# Patient Record
Sex: FEMALE | Race: BLACK OR AFRICAN AMERICAN
[De-identification: names, ages, dates, MRNs, and addresses within clinical notes are randomized per-mention and may not be internally consistent; named-entity substitution may affect disease eponyms.]

---

## 2018-06-21 ENCOUNTER — RX ONLY (RX ONLY)
Age: 53
End: 2018-06-21

## 2018-06-21 ENCOUNTER — DOCTOR'S OFFICE (OUTPATIENT)
Dept: URBAN - METROPOLITAN AREA CLINIC 136 | Facility: CLINIC | Age: 53
Setting detail: OPHTHALMOLOGY
End: 2018-06-21
Payer: COMMERCIAL

## 2018-06-21 DIAGNOSIS — H25.12: ICD-10-CM

## 2018-06-21 DIAGNOSIS — Z83.511: ICD-10-CM

## 2018-06-21 DIAGNOSIS — H10.11: ICD-10-CM

## 2018-06-21 DIAGNOSIS — H40.052: ICD-10-CM

## 2018-06-21 DIAGNOSIS — H25.11: ICD-10-CM

## 2018-06-21 DIAGNOSIS — H40.051: ICD-10-CM

## 2018-06-21 DIAGNOSIS — H02.011: ICD-10-CM

## 2018-06-21 DIAGNOSIS — H10.12: ICD-10-CM

## 2018-06-21 PROCEDURE — 92133 CPTRZD OPH DX IMG PST SGM ON: CPT | Performed by: OPHTHALMOLOGY

## 2018-06-21 PROCEDURE — 92004 COMPRE OPH EXAM NEW PT 1/>: CPT | Performed by: OPHTHALMOLOGY

## 2018-06-21 ASSESSMENT — REFRACTION_MANIFEST
OS_VA3: 20/
OU_VA: 20/
OD_VA3: 20/
OD_VA3: 20/
OD_VA1: 20/
OS_VA2: 20/
OD_VA3: 20/
OS_VA3: 20/
OS_VA1: 20/
OU_VA: 20/
OS_VA1: 20/
OS_VA2: 20/
OU_VA: 20/
OD_VA2: 20/
OD_VA2: 20/
OS_VA1: 20/
OD_VA1: 20/
OS_VA3: 20/
OD_VA1: 20/
OD_VA2: 20/
OS_VA2: 20/

## 2018-06-21 ASSESSMENT — REFRACTION_CURRENTRX
OD_OVR_VA: 20/
OD_ADD: +2.50
OD_AXIS: 018
OD_VPRISM_DIRECTION: BF
OS_SPHERE: +2.25
OS_OVR_VA: 20/
OD_SPHERE: +2.00
OD_OVR_VA: 20/
OS_OVR_VA: 20/
OS_ADD: +2.50
OS_OVR_VA: 20/
OD_CYLINDER: -0.50
OS_VPRISM_DIRECTION: BF
OD_OVR_VA: 20/

## 2018-06-21 ASSESSMENT — REFRACTION_AUTOREFRACTION
OD_AXIS: 100
OS_CYLINDER: -1.00
OS_AXIS: 82
OD_CYLINDER: -0.50
OD_SPHERE: +2.25
OS_SPHERE: +2.75

## 2018-06-21 ASSESSMENT — VISUAL ACUITY
OS_BCVA: 20/25-2
OD_BCVA: 20/25-2

## 2018-06-21 ASSESSMENT — SPHEQUIV_DERIVED
OS_SPHEQUIV: 2.25
OD_SPHEQUIV: 2

## 2018-06-21 ASSESSMENT — LID EXAM ASSESSMENTS: OD_TRICHIASIS: RUL 1+

## 2018-06-21 ASSESSMENT — CONFRONTATIONAL VISUAL FIELD TEST (CVF)
OD_FINDINGS: FULL
OS_FINDINGS: FULL

## 2019-07-18 ENCOUNTER — HOSPITAL ENCOUNTER (EMERGENCY)
Facility: HOSPITAL | Age: 54
Discharge: HOME | End: 2019-07-18
Attending: EMERGENCY MEDICINE | Admitting: EMERGENCY MEDICINE
Payer: COMMERCIAL

## 2019-07-18 ENCOUNTER — APPOINTMENT (EMERGENCY)
Dept: RADIOLOGY | Facility: HOSPITAL | Age: 54
End: 2019-07-18
Payer: COMMERCIAL

## 2019-07-18 VITALS
OXYGEN SATURATION: 100 % | WEIGHT: 166.23 LBS | TEMPERATURE: 98 F | HEART RATE: 85 BPM | DIASTOLIC BLOOD PRESSURE: 81 MMHG | SYSTOLIC BLOOD PRESSURE: 145 MMHG | RESPIRATION RATE: 18 BRPM

## 2019-07-18 DIAGNOSIS — S92.109A TALAR FRACTURE: Primary | ICD-10-CM

## 2019-07-18 PROCEDURE — 73610 X-RAY EXAM OF ANKLE: CPT

## 2019-07-18 PROCEDURE — 96372 THER/PROPH/DIAG INJ SC/IM: CPT

## 2019-07-18 PROCEDURE — 99283 EMERGENCY DEPT VISIT LOW MDM: CPT

## 2019-07-18 PROCEDURE — 99284 EMERGENCY DEPT VISIT MOD MDM: CPT | Performed by: EMERGENCY MEDICINE

## 2019-07-18 RX ORDER — MULTIVITAMIN
1 TABLET ORAL DAILY
COMMUNITY

## 2019-07-18 RX ORDER — CHOLECALCIFEROL (VITAMIN D3) 50 MCG
2000 TABLET ORAL DAILY
COMMUNITY

## 2019-07-18 RX ORDER — LISINOPRIL 20 MG/1
20 TABLET ORAL EVERY MORNING
COMMUNITY

## 2019-07-18 RX ORDER — KETOROLAC TROMETHAMINE 30 MG/ML
15 INJECTION, SOLUTION INTRAMUSCULAR; INTRAVENOUS ONCE
Status: COMPLETED | OUTPATIENT
Start: 2019-07-18 | End: 2019-07-18

## 2019-07-18 RX ORDER — TRAMADOL HYDROCHLORIDE 50 MG/1
50 TABLET ORAL EVERY 6 HOURS PRN
Qty: 8 TABLET | Refills: 0 | Status: SHIPPED | OUTPATIENT
Start: 2019-07-18

## 2019-07-18 RX ORDER — ACETAMINOPHEN 325 MG/1
650 TABLET ORAL ONCE
Status: COMPLETED | OUTPATIENT
Start: 2019-07-18 | End: 2019-07-18

## 2019-07-18 RX ADMIN — KETOROLAC TROMETHAMINE 15 MG: 30 INJECTION, SOLUTION INTRAMUSCULAR at 08:44

## 2019-07-18 RX ADMIN — ACETAMINOPHEN 650 MG: 325 TABLET ORAL at 08:42

## 2019-07-18 NOTE — ED PROVIDER NOTES
History  Chief Complaint   Patient presents with    Ankle Pain     pt c/o left ankle pain; pt states she injured the ankle about x1 month ago and the pain is still there; pt states she is seeing podiatrist but without any relief; pt denies any other concerns     47 y o  F p/w left ankle pain x 1 month  Pt comes to the ER along with a family member who is being seen for thumb pain  Pt reports she fell a month ago and fractured her ankle, however the [facility] xray is read as negative for fracture on 6/6/19  She states she has been following with a podiatrist who placed her in a boot  Boot was recently removed and pt is now in a splint  Still having pain in her ankle  Pt reports she has a f/u appt with her podiatrist in 2 weeks, but wants to know why she's still having pain now  She has not attempted to contact her podiatrist prior to coming to the ER  There is also a DVT study that was negative on 6/27/19 for herleft ankle pain  History provided by:  Patient   used: No    Ankle Pain   Location:  Ankle  Time since incident:  1 month  Injury: yes    Mechanism of injury: fall    Ankle location:  L ankle  Chronicity:  New  Relieved by:  Nothing  Worsened by:  Nothing  Ineffective treatments:  NSAIDs  Associated symptoms: no fever, no numbness and no tingling        Prior to Admission Medications   Prescriptions Last Dose Informant Patient Reported? Taking? BLACK COHOSH HOT FLASH RELIEF PO   Yes Yes   Sig: Take 1 tablet by mouth daily   Cholecalciferol (VITAMIN D) 2000 units tablet   Yes Yes   Sig: Take 2,000 Units by mouth daily   Multiple Vitamin (MULTIVITAMIN) tablet   Yes Yes   Sig: Take 1 tablet by mouth daily   lisinopril (ZESTRIL) 20 mg tablet   Yes Yes   Sig: Take 20 mg by mouth daily   metFORMIN (GLUCOPHAGE) 500 mg tablet   Yes Yes   Sig: Take 500 mg by mouth daily with breakfast      Facility-Administered Medications: None       History reviewed   No pertinent past medical history  Past Surgical History:   Procedure Laterality Date    APPENDECTOMY      HYSTERECTOMY         History reviewed  No pertinent family history  I have reviewed and agree with the history as documented  Social History     Tobacco Use    Smoking status: Former Smoker    Smokeless tobacco: Never Used   Substance Use Topics    Alcohol use: Not Currently    Drug use: Never        Review of Systems   Constitutional: Negative for fever  Musculoskeletal:        Left ankle pain   Skin: Negative for color change, rash and wound  Neurological: Negative for numbness  Physical Exam  Physical Exam   Constitutional: She appears well-developed and well-nourished  Non-toxic appearance  She does not have a sickly appearance  She does not appear ill  No distress  HENT:   Head: Normocephalic  Neck: No JVD present  Cardiovascular: Intact distal pulses  Pulses:       Dorsalis pedis pulses are 2+ on the left side  Pulmonary/Chest: No respiratory distress  Musculoskeletal: She exhibits no deformity  Left ankle: She exhibits decreased range of motion (2/2 pain) and swelling (Medial/lateral malleoli)  She exhibits no ecchymosis, no deformity and normal pulse  Tenderness  Lateral malleolus and medial malleolus tenderness found  Left lower leg: Normal         Left foot: There is no tenderness, normal capillary refill, no crepitus and no deformity  No calf tenderness  No calf swelling  Feet:   Left Foot:   Skin Integrity: Negative for erythema or callus  Neurological: She is alert  She has normal strength  Skin: Skin is warm and dry  Capillary refill takes less than 2 seconds  No rash noted  She is not diaphoretic  No erythema  Nursing note and vitals reviewed        Vital Signs  ED Triage Vitals [07/18/19 0825]   Temperature Pulse Respirations Blood Pressure SpO2   98 °F (36 7 °C) 85 18 145/81 100 %      Temp Source Heart Rate Source Patient Position - Orthostatic VS BP Location FiO2 (%)   Oral Monitor Sitting Right arm --      Pain Score       Worst Possible Pain           Vitals:    07/18/19 0825   BP: 145/81   Pulse: 85   Patient Position - Orthostatic VS: Sitting         Visual Acuity      ED Medications  Medications   ketorolac (TORADOL) injection 15 mg (15 mg Intramuscular Given 7/18/19 0844)   acetaminophen (TYLENOL) tablet 650 mg (650 mg Oral Given 7/18/19 0842)       Diagnostic Studies  Results Reviewed     None                 XR ankle 3+ views LEFT   ED Interpretation by Oli Gutierrez 24, DO (07/18 4851)   Abnormal   Talar fx best seen in image 2                 Procedures  Procedures       ED Course  ED Course as of Jul 18 1021   Thu Jul 18, 2019   0954 Pt updated on results  Will order splint, and pt asking for a referral to our podiatrists  MDM  Number of Diagnoses or Management Options     Amount and/or Complexity of Data Reviewed  Tests in the radiology section of CPT®: ordered and reviewed        Disposition  Final diagnoses:   Talar fracture     Time reflects when diagnosis was documented in both MDM as applicable and the Disposition within this note     Time User Action Codes Description Comment    7/18/2019  9:48 AM Kendal Martin Add [U80 [id number]] Talar fracture       ED Disposition     ED Disposition Condition Date/Time Comment    Discharge Stable u Jul 18, 2019  9:53 AM Kulwinder Mcmullen discharge to home/self care              Follow-up Information     Follow up With Specialties Details Why Contact Info Additional Information    [facility] Schedule an appointment as soon as possible for a visit   [address] [address]  [id number]  [address] [place] [address], [address], [phone]          Patient's Medications   Discharge Prescriptions    TRAMADOL (ULTRAM) 50 MG TABLET    Take 1 tablet (50 mg total) by mouth every 6 (six) hours as needed for moderate pain       Start Date: 7/18/2019 End Date: --       Order Dose: 50 mg       Quantity: 8 tablet    Refills: 0     No discharge procedures on file      ED Provider  Electronically Signed by           Demetrius Peralta DO  07/18/19 0044

## 2019-07-18 NOTE — H&P (VIEW-ONLY)
History  Chief Complaint   Patient presents with    Ankle Pain     pt c/o left ankle pain; pt states she injured the ankle about x1 month ago and the pain is still there; pt states she is seeing podiatrist but without any relief; pt denies any other concerns     47 y o  F p/w left ankle pain x 1 month  Pt comes to the ER along with a family member who is being seen for thumb pain  Pt reports she fell a month ago and fractured her ankle, however the [facility] xray is read as negative for fracture on 6/6/19  She states she has been following with a podiatrist who placed her in a boot  Boot was recently removed and pt is now in a splint  Still having pain in her ankle  Pt reports she has a f/u appt with her podiatrist in 2 weeks, but wants to know why she's still having pain now  She has not attempted to contact her podiatrist prior to coming to the ER  There is also a DVT study that was negative on 6/27/19 for herleft ankle pain  History provided by:  Patient   used: No    Ankle Pain   Location:  Ankle  Time since incident:  1 month  Injury: yes    Mechanism of injury: fall    Ankle location:  L ankle  Chronicity:  New  Relieved by:  Nothing  Worsened by:  Nothing  Ineffective treatments:  NSAIDs  Associated symptoms: no fever, no numbness and no tingling        Prior to Admission Medications   Prescriptions Last Dose Informant Patient Reported? Taking? BLACK COHOSH HOT FLASH RELIEF PO   Yes Yes   Sig: Take 1 tablet by mouth daily   Cholecalciferol (VITAMIN D) 2000 units tablet   Yes Yes   Sig: Take 2,000 Units by mouth daily   Multiple Vitamin (MULTIVITAMIN) tablet   Yes Yes   Sig: Take 1 tablet by mouth daily   lisinopril (ZESTRIL) 20 mg tablet   Yes Yes   Sig: Take 20 mg by mouth daily   metFORMIN (GLUCOPHAGE) 500 mg tablet   Yes Yes   Sig: Take 500 mg by mouth daily with breakfast      Facility-Administered Medications: None       History reviewed   No pertinent past medical history  Past Surgical History:   Procedure Laterality Date    APPENDECTOMY      HYSTERECTOMY         History reviewed  No pertinent family history  I have reviewed and agree with the history as documented  Social History     Tobacco Use    Smoking status: Former Smoker    Smokeless tobacco: Never Used   Substance Use Topics    Alcohol use: Not Currently    Drug use: Never        Review of Systems   Constitutional: Negative for fever  Musculoskeletal:        Left ankle pain   Skin: Negative for color change, rash and wound  Neurological: Negative for numbness  Physical Exam  Physical Exam   Constitutional: She appears well-developed and well-nourished  Non-toxic appearance  She does not have a sickly appearance  She does not appear ill  No distress  HENT:   Head: Normocephalic  Neck: No JVD present  Cardiovascular: Intact distal pulses  Pulses:       Dorsalis pedis pulses are 2+ on the left side  Pulmonary/Chest: No respiratory distress  Musculoskeletal: She exhibits no deformity  Left ankle: She exhibits decreased range of motion (2/2 pain) and swelling (Medial/lateral malleoli)  She exhibits no ecchymosis, no deformity and normal pulse  Tenderness  Lateral malleolus and medial malleolus tenderness found  Left lower leg: Normal         Left foot: There is no tenderness, normal capillary refill, no crepitus and no deformity  No calf tenderness  No calf swelling  Feet:   Left Foot:   Skin Integrity: Negative for erythema or callus  Neurological: She is alert  She has normal strength  Skin: Skin is warm and dry  Capillary refill takes less than 2 seconds  No rash noted  She is not diaphoretic  No erythema  Nursing note and vitals reviewed        Vital Signs  ED Triage Vitals [07/18/19 0825]   Temperature Pulse Respirations Blood Pressure SpO2   98 °F (36 7 °C) 85 18 145/81 100 %      Temp Source Heart Rate Source Patient Position - Orthostatic VS BP Location FiO2 (%)   Oral Monitor Sitting Right arm --      Pain Score       Worst Possible Pain           Vitals:    07/18/19 0825   BP: 145/81   Pulse: 85   Patient Position - Orthostatic VS: Sitting         Visual Acuity      ED Medications  Medications   ketorolac (TORADOL) injection 15 mg (15 mg Intramuscular Given 7/18/19 0844)   acetaminophen (TYLENOL) tablet 650 mg (650 mg Oral Given 7/18/19 0842)       Diagnostic Studies  Results Reviewed     None                 XR ankle 3+ views LEFT   ED Interpretation by DO Bruno (07/18 1289)   Abnormal   Talar fx best seen in image 2                 Procedures  Procedures       ED Course  ED Course as of Jul 18 1021   Thu Jul 18, 2019   0954 Pt updated on results  Will order splint, and pt asking for a referral to our podiatrists  MDM  Number of Diagnoses or Management Options     Amount and/or Complexity of Data Reviewed  Tests in the radiology section of CPT®: ordered and reviewed        Disposition  Final diagnoses:   Talar fracture     Time reflects when diagnosis was documented in both MDM as applicable and the Disposition within this note     Time User Action Codes Description Comment    7/18/2019  9:48 AM Kendal Martin Add [R61 [id number]] Talar fracture       ED Disposition     ED Disposition Condition Date/Time Comment    Discharge Stable u Jul 18, 2019  9:53 AM Anthony Sanford discharge to home/self care              Follow-up Information     Follow up With Specialties Details Why Contact Info Additional Information    [facility] Schedule an appointment as soon as possible for a visit   [address] [facility] [id number]  [address] [place] [address], [address] [address]          Patient's Medications   Discharge Prescriptions    TRAMADOL (ULTRAM) 50 MG TABLET    Take 1 tablet (50 mg total) by mouth every 6 (six) hours as needed for moderate pain       Start Date: 7/18/2019 End Date: --       Order Dose: 50 mg       Quantity: 8 tablet    Refills: 0     No discharge procedures on file      ED Provider  Electronically Signed by           Oli Becker,   07/18/19 6992

## 2019-07-24 ENCOUNTER — OFFICE VISIT (OUTPATIENT)
Dept: PODIATRY | Facility: CLINIC | Age: 54
End: 2019-07-24
Payer: COMMERCIAL

## 2019-07-24 VITALS
HEIGHT: 61 IN | BODY MASS INDEX: 32.28 KG/M2 | DIASTOLIC BLOOD PRESSURE: 90 MMHG | WEIGHT: 171 LBS | SYSTOLIC BLOOD PRESSURE: 130 MMHG

## 2019-07-24 DIAGNOSIS — M25.372 ANKLE INSTABILITY, LEFT: ICD-10-CM

## 2019-07-24 DIAGNOSIS — S92.122A: Primary | ICD-10-CM

## 2019-07-24 PROBLEM — I10 ESSENTIAL HYPERTENSION: Status: ACTIVE | Noted: 2019-07-24

## 2019-07-24 PROCEDURE — 99203 OFFICE O/P NEW LOW 30 MIN: CPT | Performed by: PODIATRIST

## 2019-07-24 NOTE — PROGRESS NOTES
Assessment/Plan:    No problem-specific Assessment & Plan notes found for this encounter  Diagnoses and all orders for this visit:    Displaced fracture of body of left talus, initial encounter for closed fracture  -     MRI ankle/heel right  wo contrast; Future    Ankle instability, left  -     MRI ankle/heel right  wo contrast; Future      patient has 3month-old talar body fracture along the medial process of the talus  She has been walking on it due to missed diagnosis from original x-rays at [facility]   At this point I am concerned with how much soft tissue as well as bony damage has been done  I have ordered an MRI for surgical planning  Patient is to be strict nonweightbearing to the foot  Subjective:      Patient ID: Bette Mcbride is a 47 y o  female  Patient has severe left ankle pain  June 6, 2019 she got light headed and fell  She twisted her left foot as she fell  She did not hit her head  Her left ankle immediately hurt and started to swell  She called an ambulance because it "blew up so fast " In the ED she got XRays and was told it was a sprain  She was admitted at [facility] for pain and met with PT  She was given a walker and a splint and was sent home  Pain was not getting better  Her PCP sent her to [facility]  A podiatrist took a new XRay and diagnosed a hairline fracture  She was given a CAM boot and told to limit WB to only when necessary  After a few eeks she was told to transition out of boot and the pain is still severe  She went to [facility] 7/18 and got a new XRay which showed a talar body fracture  She was put in a splint and given crutches and sent here  She cannot walk on the foot without severe pain  PAtient is "oprediabetic", A1C was 6 4 in June         The following portions of the patient's history were reviewed and updated as appropriate: allergies, current medications, past family history, past medical history, past social history, past surgical history and problem list     Review of Systems   Constitutional: Negative for activity change  Respiratory: Negative for cough  Gastrointestinal: Negative for diarrhea and nausea  Musculoskeletal: Positive for arthralgias, gait problem, joint swelling and myalgias  Skin: Negative for wound  Neurological: Negative for numbness  Objective:      /90   Ht 5' 1" (1 549 m)   Wt 77 6 kg (171 lb)   BMI 32 31 kg/m²          Physical Exam    Vitals reviewed    Constitutional: Patient is not distressed  Patient is well developed    Vascular: Dorsalis pedis and posterior tibial pulses 2/4  Capillary refill time within normal limits to all digits  No erythema  No edema  Dermatology: No rash, no open lesions  Present pedal hair  Musculoskeletal:  Severe pain medial ankle just distal to the medial malleolus over the talar body  Pain with inversion eversion of the ankle medially  No ankle pain with dorsiflexion or plantar flexion  No distal forefoot pain  No calf pain with compression  Neurological exam: Monofilament sensation intact  Vibratory sensation intact   Achilles reflex is normal

## 2019-07-30 ENCOUNTER — TELEPHONE (OUTPATIENT)
Dept: PODIATRY | Facility: CLINIC | Age: 54
End: 2019-07-30

## 2019-07-30 ENCOUNTER — HOSPITAL ENCOUNTER (OUTPATIENT)
Dept: MRI IMAGING | Facility: HOSPITAL | Age: 54
Discharge: HOME | End: 2019-07-30
Attending: PODIATRIST
Payer: COMMERCIAL

## 2019-07-30 DIAGNOSIS — S92.122A: ICD-10-CM

## 2019-07-30 DIAGNOSIS — M25.372 ANKLE INSTABILITY, LEFT: ICD-10-CM

## 2019-07-30 PROCEDURE — 73721 MRI JNT OF LWR EXTRE W/O DYE: CPT

## 2019-07-30 NOTE — TELEPHONE ENCOUNTER
I received a call from Magaly Griffiths 83  Vikash Jean Baptiste is there for her MRI and the order reads for her right foot and it is really for her left foot    As Dr Jane Cruz is out of the office, they accepted a verbal order from Dr Cira Mosley

## 2019-08-01 ENCOUNTER — TELEPHONE (OUTPATIENT)
Dept: PODIATRY | Facility: CLINIC | Age: 54
End: 2019-08-01

## 2019-08-01 NOTE — TELEPHONE ENCOUNTER
I'll send Rx strength ibuprofen to pharmacy   I can't and wont prescribe narcotics for a 3month old injury

## 2019-08-01 NOTE — TELEPHONE ENCOUNTER
I spoke with Dr Alphonso Moya and he said to call Tamra Mclean and tell her to take 3 OTC ibuprofen every 6 hours  He said he tried to send a Rx to the pharmacy for stronger ibuprofen but she doesn't have a pharmacy listed  She said she will continue to take the ibuprofen and will talke to Dr Alphonso Moya at her appointment next week

## 2019-08-01 NOTE — TELEPHONE ENCOUNTER
She is in severe pain and is not taking any medication for it  Can you send something in to the pharmacy?

## 2019-08-07 ENCOUNTER — OFFICE VISIT (OUTPATIENT)
Dept: PODIATRY | Facility: CLINIC | Age: 54
End: 2019-08-07
Payer: COMMERCIAL

## 2019-08-07 ENCOUNTER — PREP FOR PROCEDURE (OUTPATIENT)
Dept: PODIATRY | Facility: CLINIC | Age: 54
End: 2019-08-07

## 2019-08-07 VITALS
HEIGHT: 61 IN | WEIGHT: 171 LBS | DIASTOLIC BLOOD PRESSURE: 74 MMHG | BODY MASS INDEX: 32.28 KG/M2 | SYSTOLIC BLOOD PRESSURE: 121 MMHG

## 2019-08-07 DIAGNOSIS — S93.491D SPRAIN OF ANTERIOR TALOFIBULAR LIGAMENT OF RIGHT ANKLE, SUBSEQUENT ENCOUNTER: Primary | ICD-10-CM

## 2019-08-07 DIAGNOSIS — M25.372 ANKLE INSTABILITY, LEFT: ICD-10-CM

## 2019-08-07 DIAGNOSIS — S93.409A RUPTURE OF LIGAMENT OF ANKLE, INITIAL ENCOUNTER: Primary | ICD-10-CM

## 2019-08-07 PROBLEM — S93.491A SPRAIN OF ANTERIOR TALOFIBULAR LIGAMENT OF RIGHT ANKLE: Status: ACTIVE | Noted: 2019-08-07

## 2019-08-07 PROCEDURE — 99214 OFFICE O/P EST MOD 30 MIN: CPT | Performed by: PODIATRIST

## 2019-08-07 RX ORDER — IBUPROFEN 800 MG/1
800 TABLET ORAL AS NEEDED
Refills: 0 | COMMUNITY
Start: 2019-07-02

## 2019-08-07 RX ORDER — CYCLOBENZAPRINE HCL 10 MG
TABLET ORAL
Refills: 0 | COMMUNITY
Start: 2019-06-04 | End: 2019-08-12

## 2019-08-07 NOTE — PROGRESS NOTES
Assessment/Plan:    No problem-specific Assessment & Plan notes found for this encounter  Diagnoses and all orders for this visit:    Rupture of ligament of ankle, initial encounter    Ankle instability, left    Other orders  -     ibuprofen (MOTRIN) 800 mg tablet; Take 800 mg by mouth 3 (three) times daily after meals  -     cyclobenzaprine (FLEXERIL) 10 mg tablet; take 1 tablet by mouth if needed at bedtime  -     ceFAZolin (ANCEF) 2,000 mg in dextrose 5 % 100 mL IVPB      Since MRI shows torn ankle syndesmosis and AI TFL ligament with widening of the ankle mortise  This will need surgical intervention  She must be strict nonweightbearing  I reviewed the MRI findings with the patient today  There is some chronic tearing of her lateral medial ankle ligaments which I will test intraoperatively as to whether they need repair as well  There is a small OCD lesion I am hoping this should resolve once we stabilize her ankle  Consent was signed today for repair of ankle syndesmosis with open reduction internal fixation, repair of a ATFL ligament with internal brace, possible repair of lateral and medial ankle ligaments  Alternatives, risks, complications were discussed  Questions were answered  No guarantees were given outcome  Patient will be sent for preoperative clearance  Surgery is being planned for next Friday  She must be strict nonweightbearing  Extra time was spent today (25 minutes) for counseling preop assessment postoperative reduction and planning  Subjective:      Patient ID: Markel Aguilar is a 47 y o  female  Patient is following up for the MRI for left ankle pain  Her pain is severe  She has continued to walk on the ankle despite me telling her not to  She is in a Cam boot today  Her ankle hurts "everywhere        The following portions of the patient's history were reviewed and updated as appropriate: allergies, current medications, past family history, past medical history, past social history, past surgical history and problem list     Review of Systems      Objective:      /74   Ht 5' 1" (1 549 m)   Wt 77 6 kg (171 lb) Comment: pt in cam walker  BMI 32 31 kg/m²          Physical Exam      Vitals reviewed    Constitutional: Patient is not distressed  Patient is well developed    Vascular: Dorsalis pedis and posterior tibial pulses 2/4  Capillary refill time within normal limits to all digits  No erythema  No edema  Dermatology: No rash, no open lesions  Present pedal hair  Musculoskeletal:   Severe left ankle pain  Positive pain with tib-fib squeeze test   Positive pain with anterior drawer although it does not feel unstable  No peroneal pain or weakness  Extensor tendons are intact  Mild medial ankle pain  Achilles tendon is intact and, negative Pereira test     Neurological exam: Monofilament sensation intact  Vibratory sensation intact   Achilles reflex is normal

## 2019-08-12 RX ORDER — DULOXETIN HYDROCHLORIDE 30 MG/1
30 CAPSULE, DELAYED RELEASE ORAL
COMMUNITY

## 2019-08-12 NOTE — PRE-PROCEDURE INSTRUCTIONS
Pre-Surgery Instructions:   Medication Instructions    BLACK COHOSH HOT FLASH RELIEF PO Instructed patient per Anesthesia Guidelines   Cholecalciferol (VITAMIN D) 2000 units tablet Instructed patient per Anesthesia Guidelines   DULoxetine (CYMBALTA) 30 mg delayed release capsule Instructed patient per Anesthesia Guidelines   Famotidine-Ca Carb-Mag Hydrox (PEPCID COMPLETE PO) Instructed patient per Anesthesia Guidelines   ibuprofen (MOTRIN) 800 mg tablet Instructed patient per Anesthesia Guidelines   lisinopril (ZESTRIL) 20 mg tablet Instructed patient per Anesthesia Guidelines   metFORMIN (GLUCOPHAGE) 500 mg tablet Instructed patient per Anesthesia Guidelines   Multiple Vitamin (MULTIVITAMIN) tablet Instructed patient per Anesthesia Guidelines   traMADol (ULTRAM) 50 mg tablet Instructed patient per Anesthesia Guidelines  Per anesthesia patient was told to stop NSAIDS and supplements as of today and no medications are needed on morning of surgery

## 2019-08-15 ENCOUNTER — ANESTHESIA EVENT (OUTPATIENT)
Dept: PERIOP | Facility: HOSPITAL | Age: 54
End: 2019-08-15
Payer: COMMERCIAL

## 2019-08-16 ENCOUNTER — ANESTHESIA (OUTPATIENT)
Dept: PERIOP | Facility: HOSPITAL | Age: 54
End: 2019-08-16
Payer: COMMERCIAL

## 2019-08-16 ENCOUNTER — HOSPITAL ENCOUNTER (OUTPATIENT)
Facility: HOSPITAL | Age: 54
Setting detail: OUTPATIENT SURGERY
Discharge: HOME | End: 2019-08-16
Attending: PODIATRIST | Admitting: PODIATRIST
Payer: COMMERCIAL

## 2019-08-16 ENCOUNTER — HOSPITAL ENCOUNTER (OUTPATIENT)
Dept: RADIOLOGY | Facility: HOSPITAL | Age: 54
Setting detail: OUTPATIENT SURGERY
Discharge: HOME | End: 2019-08-16
Payer: COMMERCIAL

## 2019-08-16 ENCOUNTER — APPOINTMENT (OUTPATIENT)
Dept: RADIOLOGY | Facility: HOSPITAL | Age: 54
End: 2019-08-16
Payer: COMMERCIAL

## 2019-08-16 VITALS
HEART RATE: 85 BPM | DIASTOLIC BLOOD PRESSURE: 69 MMHG | HEIGHT: 61 IN | RESPIRATION RATE: 18 BRPM | WEIGHT: 171 LBS | BODY MASS INDEX: 32.28 KG/M2 | TEMPERATURE: 98.4 F | SYSTOLIC BLOOD PRESSURE: 156 MMHG | OXYGEN SATURATION: 100 %

## 2019-08-16 DIAGNOSIS — Z98.890 POST-OPERATIVE STATE: Primary | ICD-10-CM

## 2019-08-16 DIAGNOSIS — S93.491D SPRAIN OF ANTERIOR TALOFIBULAR LIGAMENT OF RIGHT ANKLE, SUBSEQUENT ENCOUNTER: ICD-10-CM

## 2019-08-16 LAB
GLUCOSE SERPL-MCNC: 149 MG/DL (ref 65–140)
GLUCOSE SERPL-MCNC: 94 MG/DL (ref 65–140)

## 2019-08-16 PROCEDURE — 27695 REPAIR OF ANKLE LIGAMENT: CPT | Performed by: PODIATRIST

## 2019-08-16 PROCEDURE — 27829 TREAT LOWER LEG JOINT: CPT | Performed by: PODIATRIST

## 2019-08-16 PROCEDURE — C1713 ANCHOR/SCREW BN/BN,TIS/BN: HCPCS | Performed by: PODIATRIST

## 2019-08-16 PROCEDURE — 73600 X-RAY EXAM OF ANKLE: CPT

## 2019-08-16 PROCEDURE — 82948 REAGENT STRIP/BLOOD GLUCOSE: CPT

## 2019-08-16 PROCEDURE — 73610 X-RAY EXAM OF ANKLE: CPT

## 2019-08-16 DEVICE — KIT REPAIR LIGAMENT AUG INTERNALBRACE: Type: IMPLANTABLE DEVICE | Site: ANKLE | Status: FUNCTIONAL

## 2019-08-16 DEVICE — SCREW CORT 3.5 X 12MM LOPRFL: Type: IMPLANTABLE DEVICE | Site: ANKLE | Status: FUNCTIONAL

## 2019-08-16 DEVICE — SCREW CORT 3.5 X 16MM LOPRFL: Type: IMPLANTABLE DEVICE | Site: ANKLE | Status: FUNCTIONAL

## 2019-08-16 DEVICE — PLATE LCK 1/3 TUBL AVULSION DELTOID 4HL: Type: IMPLANTABLE DEVICE | Site: ANKLE | Status: FUNCTIONAL

## 2019-08-16 DEVICE — KIT SYNDESMOSIS TIGHTROPE XP: Type: IMPLANTABLE DEVICE | Site: ANKLE | Status: FUNCTIONAL

## 2019-08-16 RX ORDER — KETOROLAC TROMETHAMINE 30 MG/ML
INJECTION, SOLUTION INTRAMUSCULAR; INTRAVENOUS AS NEEDED
Status: DISCONTINUED | OUTPATIENT
Start: 2019-08-16 | End: 2019-08-16 | Stop reason: SURG

## 2019-08-16 RX ORDER — FENTANYL CITRATE/PF 50 MCG/ML
50 SYRINGE (ML) INJECTION
Status: DISCONTINUED | OUTPATIENT
Start: 2019-08-16 | End: 2019-08-16 | Stop reason: HOSPADM

## 2019-08-16 RX ORDER — PROPOFOL 10 MG/ML
INJECTION, EMULSION INTRAVENOUS AS NEEDED
Status: DISCONTINUED | OUTPATIENT
Start: 2019-08-16 | End: 2019-08-16 | Stop reason: SURG

## 2019-08-16 RX ORDER — CEFAZOLIN SODIUM 1 G/50ML
1000 SOLUTION INTRAVENOUS ONCE
Status: COMPLETED | OUTPATIENT
Start: 2019-08-16 | End: 2019-08-16

## 2019-08-16 RX ORDER — MAGNESIUM HYDROXIDE 1200 MG/15ML
LIQUID ORAL AS NEEDED
Status: DISCONTINUED | OUTPATIENT
Start: 2019-08-16 | End: 2019-08-16 | Stop reason: HOSPADM

## 2019-08-16 RX ORDER — FENTANYL CITRATE 50 UG/ML
INJECTION, SOLUTION INTRAMUSCULAR; INTRAVENOUS AS NEEDED
Status: DISCONTINUED | OUTPATIENT
Start: 2019-08-16 | End: 2019-08-16 | Stop reason: SURG

## 2019-08-16 RX ORDER — ROPIVACAINE HYDROCHLORIDE 5 MG/ML
INJECTION, SOLUTION EPIDURAL; INFILTRATION; PERINEURAL AS NEEDED
Status: DISCONTINUED | OUTPATIENT
Start: 2019-08-16 | End: 2019-08-16 | Stop reason: SURG

## 2019-08-16 RX ORDER — DEXAMETHASONE SODIUM PHOSPHATE 4 MG/ML
INJECTION, SOLUTION INTRA-ARTICULAR; INTRALESIONAL; INTRAMUSCULAR; INTRAVENOUS; SOFT TISSUE AS NEEDED
Status: DISCONTINUED | OUTPATIENT
Start: 2019-08-16 | End: 2019-08-16 | Stop reason: SURG

## 2019-08-16 RX ORDER — OXYCODONE HYDROCHLORIDE AND ACETAMINOPHEN 5; 325 MG/1; MG/1
1 TABLET ORAL EVERY 4 HOURS PRN
Qty: 30 TABLET | Refills: 0 | Status: SHIPPED | OUTPATIENT
Start: 2019-08-16 | End: 2019-08-26

## 2019-08-16 RX ORDER — LIDOCAINE HYDROCHLORIDE 20 MG/ML
INJECTION, SOLUTION EPIDURAL; INFILTRATION; INTRACAUDAL; PERINEURAL AS NEEDED
Status: DISCONTINUED | OUTPATIENT
Start: 2019-08-16 | End: 2019-08-16 | Stop reason: SURG

## 2019-08-16 RX ORDER — SCOLOPAMINE TRANSDERMAL SYSTEM 1 MG/1
1 PATCH, EXTENDED RELEASE TRANSDERMAL ONCE AS NEEDED
Status: DISCONTINUED | OUTPATIENT
Start: 2019-08-16 | End: 2019-08-16 | Stop reason: HOSPADM

## 2019-08-16 RX ORDER — OXYCODONE HYDROCHLORIDE AND ACETAMINOPHEN 5; 325 MG/1; MG/1
1 TABLET ORAL EVERY 4 HOURS PRN
Status: DISCONTINUED | OUTPATIENT
Start: 2019-08-16 | End: 2019-08-16 | Stop reason: HOSPADM

## 2019-08-16 RX ORDER — MEPERIDINE HYDROCHLORIDE 50 MG/ML
12.5 INJECTION INTRAMUSCULAR; INTRAVENOUS; SUBCUTANEOUS ONCE AS NEEDED
Status: DISCONTINUED | OUTPATIENT
Start: 2019-08-16 | End: 2019-08-16 | Stop reason: HOSPADM

## 2019-08-16 RX ORDER — SODIUM CHLORIDE 9 MG/ML
125 INJECTION, SOLUTION INTRAVENOUS CONTINUOUS
Status: DISCONTINUED | OUTPATIENT
Start: 2019-08-16 | End: 2019-08-16 | Stop reason: HOSPADM

## 2019-08-16 RX ORDER — MIDAZOLAM HYDROCHLORIDE 1 MG/ML
INJECTION INTRAMUSCULAR; INTRAVENOUS AS NEEDED
Status: DISCONTINUED | OUTPATIENT
Start: 2019-08-16 | End: 2019-08-16 | Stop reason: SURG

## 2019-08-16 RX ORDER — ONDANSETRON 2 MG/ML
INJECTION INTRAMUSCULAR; INTRAVENOUS AS NEEDED
Status: DISCONTINUED | OUTPATIENT
Start: 2019-08-16 | End: 2019-08-16 | Stop reason: SURG

## 2019-08-16 RX ORDER — BUPIVACAINE HYDROCHLORIDE 5 MG/ML
INJECTION, SOLUTION PERINEURAL AS NEEDED
Status: DISCONTINUED | OUTPATIENT
Start: 2019-08-16 | End: 2019-08-16 | Stop reason: HOSPADM

## 2019-08-16 RX ORDER — HYDROMORPHONE HCL/PF 1 MG/ML
0.5 SYRINGE (ML) INJECTION
Status: DISCONTINUED | OUTPATIENT
Start: 2019-08-16 | End: 2019-08-16 | Stop reason: HOSPADM

## 2019-08-16 RX ORDER — ONDANSETRON 2 MG/ML
4 INJECTION INTRAMUSCULAR; INTRAVENOUS ONCE AS NEEDED
Status: DISCONTINUED | OUTPATIENT
Start: 2019-08-16 | End: 2019-08-16 | Stop reason: HOSPADM

## 2019-08-16 RX ORDER — PROMETHAZINE HYDROCHLORIDE 25 MG/ML
12.5 INJECTION, SOLUTION INTRAMUSCULAR; INTRAVENOUS ONCE AS NEEDED
Status: COMPLETED | OUTPATIENT
Start: 2019-08-16 | End: 2019-08-16

## 2019-08-16 RX ADMIN — PROMETHAZINE HYDROCHLORIDE 12.5 MG: 25 INJECTION INTRAMUSCULAR; INTRAVENOUS at 12:40

## 2019-08-16 RX ADMIN — KETOROLAC TROMETHAMINE 30 MG: 30 INJECTION, SOLUTION INTRAMUSCULAR at 14:48

## 2019-08-16 RX ADMIN — OXYCODONE HYDROCHLORIDE AND ACETAMINOPHEN 1 TABLET: 5; 325 TABLET ORAL at 16:51

## 2019-08-16 RX ADMIN — SODIUM CHLORIDE 125 ML/HR: 0.9 INJECTION, SOLUTION INTRAVENOUS at 11:48

## 2019-08-16 RX ADMIN — ROPIVACAINE HYDROCHLORIDE 30 ML: 5 INJECTION, SOLUTION EPIDURAL; INFILTRATION; PERINEURAL at 12:31

## 2019-08-16 RX ADMIN — SODIUM CHLORIDE 125 ML/HR: 0.9 INJECTION, SOLUTION INTRAVENOUS at 15:48

## 2019-08-16 RX ADMIN — FENTANYL CITRATE 50 MCG: 50 INJECTION, SOLUTION INTRAMUSCULAR; INTRAVENOUS at 13:56

## 2019-08-16 RX ADMIN — PROPOFOL 200 MG: 10 INJECTION, EMULSION INTRAVENOUS at 12:47

## 2019-08-16 RX ADMIN — SCOPALAMINE 1 PATCH: 1 PATCH, EXTENDED RELEASE TRANSDERMAL at 11:27

## 2019-08-16 RX ADMIN — DEXAMETHASONE SODIUM PHOSPHATE 6 MG: 4 INJECTION, SOLUTION INTRAMUSCULAR; INTRAVENOUS at 13:12

## 2019-08-16 RX ADMIN — ONDANSETRON 4 MG: 2 INJECTION INTRAMUSCULAR; INTRAVENOUS at 13:12

## 2019-08-16 RX ADMIN — LIDOCAINE HYDROCHLORIDE 100 MG: 20 INJECTION, SOLUTION EPIDURAL; INFILTRATION; INTRACAUDAL; PERINEURAL at 12:47

## 2019-08-16 RX ADMIN — FENTANYL CITRATE 50 MCG: 50 INJECTION, SOLUTION INTRAMUSCULAR; INTRAVENOUS at 13:35

## 2019-08-16 RX ADMIN — MIDAZOLAM 4 MG: 1 INJECTION INTRAMUSCULAR; INTRAVENOUS at 12:29

## 2019-08-16 RX ADMIN — SODIUM CHLORIDE: 0.9 INJECTION, SOLUTION INTRAVENOUS at 14:01

## 2019-08-16 RX ADMIN — CEFAZOLIN SODIUM 1000 MG: 1 SOLUTION INTRAVENOUS at 12:32

## 2019-08-16 NOTE — INTERVAL H&P NOTE
H&P reviewed  After examining the patient I find no changes in the patients condition since the H&P had been written      Vitals:    08/16/19 1134   BP: 152/70   Pulse: 80   Resp: 16   Temp: 97 8 °F (36 6 °C)   SpO2: 100%

## 2019-08-16 NOTE — OP NOTE
OPERATIVE REPORT  PATIENT NAME: William Blair    :  1965  MRN: [id number]  Pt Location: [facility] OR ROOM 03    SURGERY DATE: 2019    Surgeon(s) and Role:     * Hakeem Cason DPM - Primary     * Brian Dennison DPM - Assisting, PGY-3    Preop Diagnosis:  Sprain of anterior talofibular ligament of right ankle, subsequent encounter [S97 501D]  Syndesmotic and AITFL tear with mortise widening    Post-Op Diagnosis Codes: * Sprain of anterior talofibular ligament of right ankle, subsequent encounter [S93 491D]  Syndesmotic and AITFL tear with mortise widening    Procedure(s) (LRB):  LEFT ANKLE SYNDESMOTIC LIGAMENT & ATFL REPAIR (Left)    Specimen(s):  * No specimens in log *    Estimated Blood Loss:   5 mL    Drains:  * No LDAs found *    Anesthesia Type:   General with Popliteal block, Left    Operative Indications:  Sprain of anterior talofibular ligament of right ankle, subsequent encounter [W49 651D]  Syndesmotic and AITFL tear with mortise widening, ankle instability    Operative Findings:  Consistent with diagnosis  Patient had cleared syndesmotic instability before the procedure was begun with positive talar tilt  A conclusion of procedure syndesmosis was no longer gapping and the talar tilt was negative  Lateral talar dome was inspected and there was no cartilage damage  Complications:   None    Procedure and Technique:  Under mild sedation the patient was brought into the operating room and placed on the operating table in a supine position  She received a left popliteal block in preop holding area  Following IV sedation, LMA was placed and a time-out was performed  Left lower extremity was then scrubbed, prepped, draped in the usual aseptic manner  A 2nd time-out was performed  The left lower extremity was exsanguinated and the thigh tourniquet was inflated to 300 mm of pressure      Attention was 1st directed to the anterior lateral ankle over the anterior inferior talofibular ligament at the area of the ankle mortise  A 4 cm linear incision was made over the distal lateral ankle  Sharp and blunt dissection continued down to the level of the anterior inferior talofibular ligament  All bleeders were cauterized as necessary  Care was taken to retract all vital neurovascular structures  Upo reachin the AITFL the ligament itself did appear scarred in although thickened  A distal ankle arthrotomy was performed over the ankle gutter noted or visualized the lateral talar dome  On MRI preoperatively patient did have a sub chondral lesion in this area  The cartilage was well visualized and there was no cartilage deficit on the talus  Next a 6 cm linear incision was made over the distal fibula at the area of the ankle syndesmosis  All soft tissue capsular attachments reflected off the fibula and lateral malleolus  All bleeders were cauterized as necessary  Using C-arm for guidance a 4 hole plate was placed over the distal fibula and lateral malleolus using 2 Arthrex cortical screws at the distal and proximal end of the plate  Next a reduction clamp was used over the distal syndesmosis in order to reduce the widening of the ankle syndesmosis  This was confirmed on C-arm  Holding not reduction 2 Arthrex internal brace devices were placed from lateral to medial across the syndesmosis with excellent reduction noted  The reduction clamp was then removed to show good tib-fib overlap without widening of any of the gutters of the ankle syndesmosis  At this point the fibula was stressed and there was no longer any gapping  Three stressed inversion eversion dorsiflexion plantar flexion the ankle syndesmosis and tib-fib ligaments appeared stable  At that point it was decided that and AI T FL direct repair was on necessary as it already scarred in  A talar tilt test was then performed the did show excursion of the lateral aspect of the talus from the ankle joint suggesting ATFL insufficiency  There was no deltoid ligament insufficiency with stressed eversion of the talus  At that point was decided to reinforce the lateral ankle ligaments using the Arthrex internal brace system  A 4 cm curvilinear incision was made on the lateral malleolus extending distally and anteriorly over the area of the anterior talofibular ligament  Blunt dissection continued down to the level of the ligament  Overall the ligament was intact  Following guidelines the Arthrex internal brace was applied over the ligament  Following placement of the ligament repair system the talar tilt was negative on live x-ray  At this point all incisions were flushed  Under live x-ray the ankle was stressed  Anterior drawer was negative talar tilt was negative  There was no widening of the ankle syndesmosis  Overall the patient's ankle was anatomically stable  Deep tissue and capsular areas were closed with 2 0 Vicryl  Subcu was closed with 3 0 Vicryl  The skin was repaired with stable  10 cc of 0 5% Marcaine plain were then infiltrated and a saphenous block in the medial ankle distally  The foot was cleaned and dressed with Adaptic, 4 x 4 gauze, cast padding, sugar-tong splint, Ace bandage  The patient emerged from anesthesia and tolerated the procedure well  The tourniquet was deflated  Prompt hyperemic response was noted to all digits of the left lower extremity  There were no complications during the procedure  She was transferred to PACU vital signs stable  I was present for the entire procedure and participated in every aspect of the procedure      Patient Disposition:  PACU  and hemodynamically stable    SIGNATURE: Bernard Haider DPM  DATE: August 16, 2019  TIME: 3:08 PM

## 2019-08-16 NOTE — ANESTHESIA PREPROCEDURE EVALUATION
Review of Systems/Medical History  Patient summary reviewed  Chart reviewed  History of anesthetic complications PONV    Cardiovascular  Hypertension controlled,    Pulmonary  Sleep apnea ,        GI/Hepatic    GERD well controlled,        Negative  ROS        Endo/Other  Diabetes well controlled type 2 Oral agent,   Obesity    GYN  Negative gynecology ROS          Hematology  Negative hematology ROS      Musculoskeletal  Negative musculoskeletal ROS        Neurology  Negative neurology ROS      Psychology   Anxiety,              Physical Exam    Airway    Mallampati score: II  TM Distance: >3 FB  Neck ROM: full     Dental   No notable dental hx     Cardiovascular  Rhythm: regular, Rate: normal, Cardiovascular exam normal    Pulmonary  Pulmonary exam normal Breath sounds clear to auscultation,     Other Findings        Anesthesia Plan  ASA Score- 2     Anesthesia Type- general with ASA Monitors  Additional Monitors:   Airway Plan:     Comment: Ask surgeon about peripheral nerve block for postop pain control  SCOP patch ordered in SDS  Plan Factors-  Patient did not smoke on day of surgery  Induction- intravenous  Postoperative Plan-     Informed Consent- Anesthetic plan and risks discussed with patient

## 2019-08-16 NOTE — DISCHARGE SUMMARY
Discharge Summary Outpatient Procedure Podiatry -   Shirley Pérez 47 y o  female MRN: [id number]  Unit/Bed#: OR POOL Encounter: [id number]    Admission Date: 8/16/2019     Admitting Diagnosis: Sprain of anterior talofibular ligament of right ankle, subsequent encounter [X63 952L]    Discharge Diagnosis: same    Procedures Performed: LEFT ANKLE SYNDESMOTIC LIGAMENT & ATFL REPAIR: 07081 (CPT®)    Complications: none    Condition at Discharge: stable    Discharge instructions/Information to patient and family:   See after visit summary for information provided to patient and family  Provisions for Follow-Up Care/Important appointments:  See after visit summary for information related to follow-up care and any pertinent home health orders  Discharge Medications:  See after visit summary for reconciled discharge medications provided to patient and family

## 2019-08-16 NOTE — DISCHARGE INSTRUCTIONS
Dr Vicente Lobo Instructions    1  Take your prescribed medication as directed  You have been prescribed percocet for pain control  2  Upon arrival at home, lie down and elevate your surgical foot on 2 pillows  3  Remain quiet, off your feet as much as possible, for the first 24-48 hours  This is when your feet first swell and may become painful  After 48 hours you may begin limited walking following these restrictions:   Nonweightbearinbg to surgical foot with crutches vs walker  4  Drink large quantities of water and citrus fruit juice  Consume no alcohol  Continue a well-balanced diet  5  Report any unusual discomfort or fever to this office  6  A limited amount of discomfort and swelling is to be expected  In some cases the skin may take on a bruised appearance  The surgical solution that was applied to your foot prior to the operation is dark in color and the operation site may appear to be oozing when it actually is not  7  A slight amount of blood is to be expected, and is no cause for alarm  Do not remove the dressings  If there is active bleeding and if the bleeding persists, add additional gauze to the bandage, apply direct pressure, elevate your feet and call this office  8  Do not get the dressings wet  As regular bathing may be inconvenient, sponge baths are recommended  9  When anesthesia wears off and if any discomfort should be present, apply an ice pack directly over the operated area for 15 minute intervals for several hours or until the pain leaves  (USE IN EXCESS OF 15 MINUTES COULD CAUSE FROSTBITE)  Do not use hot water bags or electric pads  A convenient icepack can be made by placing ice cubes in a plastic bag and covering this with a towel  10  If necessary, take a mild laxative before retiring  11  Wear your special open shoes anytime you put weight on your foot, even if it is just to walk to the bathroom and back   It will probably be 2 or 3 weeks before you will be permitted to try regular shoes  12  Having performed the operation, we are interested in a prompt recovery  Please cooperate by following the above instructions  13  Please call to confirm your post-op appointment or call with any other questions

## 2019-08-16 NOTE — ANESTHESIA PROCEDURE NOTES
Peripheral Block    Patient location during procedure: pre-op  Start time: 8/16/2019 12:29 PM  Reason for block: at surgeon's request and post-op pain management  Staffing  Anesthesiologist: Remedios Mccullough DO  Performed: anesthesiologist   Preanesthetic Checklist  Completed: patient identified, site marked, surgical consent, pre-op evaluation, timeout performed, IV checked, risks and benefits discussed and monitors and equipment checked  Peripheral Block  Patient position: prone  Prep: ChloraPrep  Patient monitoring: heart rate, cardiac monitor, continuous pulse ox and frequent blood pressure checks  Block type: popliteal  Laterality: left  Injection technique: single-shot  Procedures: ultrasound guided, Ultrasound guidance required for the procedure to increase accuracy and safety of medication placement and decrease risk of complications  , nerve stimulator and 0 58 hx  Ultrasound permanent image saved  Needle  Needle localization: anatomical landmarks, nerve stimulator and ultrasound guidance  Test dose: negative  Assessment  Injection assessment: incremental injection  Paresthesia pain: none  Heart rate change: no  Slow fractionated injection: yes  Post-procedure:  site cleaned  patient tolerated the procedure well with no immediate complications

## 2019-08-21 ENCOUNTER — OFFICE VISIT (OUTPATIENT)
Dept: PODIATRY | Facility: CLINIC | Age: 54
End: 2019-08-21

## 2019-08-21 VITALS
DIASTOLIC BLOOD PRESSURE: 69 MMHG | BODY MASS INDEX: 32.28 KG/M2 | HEIGHT: 61 IN | HEART RATE: 85 BPM | WEIGHT: 171 LBS | SYSTOLIC BLOOD PRESSURE: 156 MMHG

## 2019-08-21 DIAGNOSIS — S93.491D SPRAIN OF ANTERIOR TALOFIBULAR LIGAMENT OF RIGHT ANKLE, SUBSEQUENT ENCOUNTER: Primary | ICD-10-CM

## 2019-08-21 PROCEDURE — 99024 POSTOP FOLLOW-UP VISIT: CPT | Performed by: PODIATRIST

## 2019-08-21 NOTE — PATIENT INSTRUCTIONS
1  Starting Friday, you may shower  Gently clean incision with soap and water, dry well and cover with gauze  2  Keep lower leg wrapped in ACE bandage and CAM boot at all times, elevate as much as possible     3  Once a day perform light range of motion to your left ankle, NO WEIGHT BEARING ON THE LEFT FOOT YET

## 2019-08-21 NOTE — PROGRESS NOTES
POST-OP VISIT    Mine Reese  1965      Subjective: Patient here for post-op appointment following left ankle repair  Patient denies significant pain at the surgical site, active strikethrough drainage, fevers, chills, nightsweats, SOB, chest pain, nor calf pain  The patient is in good spirits  Patient relates compliance with post-op instructions  Patient is 5 days post-op  Objective: The patient appears in NAD / non-toxic  Primary dressing and splint/cast taken down for wound inspection  VSS  No signs of infection  No active drainage  Normal post-op edema  No necrosis, dehiscence  Incisions are well coapted without any drainage or sign of infection  Staples are intact  Light passive left ankle range of motion is not painful but patient has obvious tenderness with any stress to the lower extremity  Pedal pulses are intact  Sensation is intact to all digits  Digital range of motion is intact but limited due to pain and swelling  No calf pain with compression  Negative Melvans  Assessment/Plan:     Diagnoses and all orders for this visit:    Sprain of anterior talofibular ligament of right ankle, subsequent encounter        1  Patient is stable post-op  2  Discussed compliance with weight bearing instructions, incision care, and rest  Call if any increase in pain, fevers, calf pain, shortness of breath, or general distress is noted  Patient instructed to go to ER if call is not returned immediately  3    Patient to be nonweightbearing in Cam boot with compression dressing  Plan for staple removal next week  Stressed compliance with nonweightbearing elevation and rest   Her pain is improving daily

## 2019-08-22 ENCOUNTER — TELEPHONE (OUTPATIENT)
Dept: PODIATRY | Facility: CLINIC | Age: 54
End: 2019-08-22

## 2019-08-22 NOTE — TELEPHONE ENCOUNTER
S/w Delilah Reddy- she took off dressing, put on bandage and ace wrap  When she tried to get her foot in the camboot she is getting pressure and pain on incision site so she removed it   Per Dr Tremaine Roman patient should come in office on Monday and bring boot and he will evaluate- patient should be non weightbearing and elevate as much as possible- patient understood- appointment scheduled for Monday in [place] office- gave patient address and directions

## 2019-08-22 NOTE — TELEPHONE ENCOUNTER
Patient called office- having trouble getting her cam boot on- per Dr Demetrius Luong she should take off the entire dressing including the gauze, put a bandage over the incision site, and wrap with a regular ace bandage- she should be able to fit her foot in her cam boot- s/w patient and she will try this- if she has an issues or pain getting on the boot she will call the office back

## 2019-08-26 ENCOUNTER — OFFICE VISIT (OUTPATIENT)
Dept: PODIATRY | Facility: CLINIC | Age: 54
End: 2019-08-26

## 2019-08-26 VITALS
HEART RATE: 95 BPM | HEIGHT: 61 IN | WEIGHT: 171 LBS | BODY MASS INDEX: 32.28 KG/M2 | DIASTOLIC BLOOD PRESSURE: 92 MMHG | SYSTOLIC BLOOD PRESSURE: 162 MMHG

## 2019-08-26 DIAGNOSIS — Z09 POSTOP CHECK: Primary | ICD-10-CM

## 2019-08-26 DIAGNOSIS — S93.432S ANKLE SYNDESMOSIS DISRUPTION, LEFT, SEQUELA: ICD-10-CM

## 2019-08-26 PROCEDURE — 99024 POSTOP FOLLOW-UP VISIT: CPT | Performed by: PODIATRIST

## 2019-08-26 NOTE — PROGRESS NOTES
POST-OP VISIT    Jt Durham  1965   DOS: 8/16/2019      Subjective: Patient here for post-op appointment following left ankle ligament repair  Patient denies significant pain at the surgical site, active strikethrough drainage, fevers, chills, nightsweats, SOB, chest pain, nor calf pain  Patient relates compliance with post-op instructions  Patient is 10 days post-op  Patient made appt today because she cannot fit into her boot  Objective: The patient appears in NAD / non-toxic  Primary dressing and splint/cast taken down for wound inspection  VSS  No signs of infection  No active drainage  Normal post-op edema  No necrosis, dehiscence  Minimal left ankle swelling  Incisions are healed  Passive ankle ROM is not painful  She is very tender lateral ankle  No posterior calf pain  Patient also reports tenderness on the medial malleolus    Assessment/Plan:     Diagnoses and all orders for this visit:    Postop check    Ankle syndesmosis disruption, left, sequela  -     Cam Boot    Other orders  -     vitamin E 600 UNIT capsule; Take 600 Units by mouth daily        1  Patient is stable post-op  2  Discussed compliance with  nonweight bearing instructions, incision care, and rest  Call if any increase in pain, fevers, calf pain, shortness of breath, or general distress is noted  Patient instructed to go to ER if call is not returned immediately  3  Staples removed  Strict NWB to left foot in boot  Daily home exercises to perform NWB, passive ROM  4   Patient is still having some significant pain but overall on exam she looks very stable  Would like to check in 2 weeks as she is still having pain issues  Incisions are fully healed

## 2019-09-11 ENCOUNTER — OFFICE VISIT (OUTPATIENT)
Dept: PODIATRY | Facility: CLINIC | Age: 54
End: 2019-09-11

## 2019-09-11 VITALS
HEART RATE: 83 BPM | SYSTOLIC BLOOD PRESSURE: 149 MMHG | WEIGHT: 171 LBS | HEIGHT: 61 IN | BODY MASS INDEX: 32.28 KG/M2 | DIASTOLIC BLOOD PRESSURE: 85 MMHG

## 2019-09-11 DIAGNOSIS — S82.892D CLOSED FRACTURE OF LEFT ANKLE WITH ROUTINE HEALING, SUBSEQUENT ENCOUNTER: ICD-10-CM

## 2019-09-11 DIAGNOSIS — S93.491D SPRAIN OF ANTERIOR TALOFIBULAR LIGAMENT OF RIGHT ANKLE, SUBSEQUENT ENCOUNTER: Primary | ICD-10-CM

## 2019-09-11 PROCEDURE — 99024 POSTOP FOLLOW-UP VISIT: CPT | Performed by: PODIATRIST

## 2019-10-02 ENCOUNTER — OFFICE VISIT (OUTPATIENT)
Dept: PODIATRY | Facility: CLINIC | Age: 54
End: 2019-10-02

## 2019-10-02 VITALS
HEIGHT: 61 IN | WEIGHT: 171 LBS | DIASTOLIC BLOOD PRESSURE: 85 MMHG | BODY MASS INDEX: 32.28 KG/M2 | HEART RATE: 83 BPM | SYSTOLIC BLOOD PRESSURE: 135 MMHG

## 2019-10-02 DIAGNOSIS — S93.491D SPRAIN OF ANTERIOR TALOFIBULAR LIGAMENT OF RIGHT ANKLE, SUBSEQUENT ENCOUNTER: Primary | ICD-10-CM

## 2019-10-02 PROCEDURE — 99024 POSTOP FOLLOW-UP VISIT: CPT | Performed by: PODIATRIST

## 2019-10-02 NOTE — PROGRESS NOTES
POST-OP VISIT    Carlin Jackmanyohannes  1965    DOS 8/16/19   Subjective: Patient here for post-op appointment following Left ankle ligament reconstruction and repair  Patient denies significant pain at the surgical site, active strikethrough drainage, fevers, chills, nightsweats, SOB, chest pain, nor calf pain  The patient is in good spirits  Patient relates compliance with post-op instructions  Patient is about 8 weeks post-op  Objective: The patient appears in NAD / non-toxic  Primary dressing and splint/cast taken down for wound inspection  VSS  No signs of infection  No active drainage  Normal post-op edema  No necrosis, dehiscence  Incision healed  Left ankle  No pain with stressed inversion/eversion of ankle  Negative drawer  Peroneal weakness but no pain  Achilles normal function  Patient able to stand solely on left foot and ankle with no pain or instability  Stephanie Sanchez was done at [facility], she brought images on XRay, Stable fixation, ankle is anatomic  Normal radiographs  Assessment/Plan:     Diagnoses and all orders for this visit:    Sprain of anterior talofibular ligament of right ankle, subsequent encounter        1  Patient is stable post-op  2  Begin WB in boot and transition to sneaker with PT, she has script  3  Over next 6 weeks, transition fully out of boot into sneaker with PT  4  She is very pleased with her progress so far

## 2019-10-03 ENCOUNTER — TELEPHONE (OUTPATIENT)
Dept: PODIATRY | Facility: CLINIC | Age: 54
End: 2019-10-03

## 2019-10-04 ENCOUNTER — TELEPHONE (OUTPATIENT)
Dept: PODIATRY | Facility: CLINIC | Age: 54
End: 2019-10-04

## 2019-10-04 DIAGNOSIS — S82.892D CLOSED FRACTURE OF LEFT ANKLE WITH ROUTINE HEALING, SUBSEQUENT ENCOUNTER: Primary | ICD-10-CM

## 2019-10-04 NOTE — TELEPHONE ENCOUNTER
I placed ambulatory order for home PT but I doubt it will get covered as she is not homebound   She can call PT to schedule, the script is in [facility]

## 2019-11-01 ENCOUNTER — TELEPHONE (OUTPATIENT)
Dept: PODIATRY | Facility: CLINIC | Age: 54
End: 2019-11-01

## 2019-11-04 NOTE — TELEPHONE ENCOUNTER
Kept attempting to fax with original number  No response, got a new number [phone] and I got a confirmation that the order for PT went through

## 2019-11-20 ENCOUNTER — OFFICE VISIT (OUTPATIENT)
Dept: PODIATRY | Facility: CLINIC | Age: 54
End: 2019-11-20
Payer: COMMERCIAL

## 2019-11-20 VITALS
HEART RATE: 80 BPM | SYSTOLIC BLOOD PRESSURE: 132 MMHG | WEIGHT: 170 LBS | HEIGHT: 61 IN | BODY MASS INDEX: 32.1 KG/M2 | DIASTOLIC BLOOD PRESSURE: 80 MMHG

## 2019-11-20 DIAGNOSIS — S82.892D CLOSED FRACTURE OF LEFT ANKLE WITH ROUTINE HEALING, SUBSEQUENT ENCOUNTER: ICD-10-CM

## 2019-11-20 DIAGNOSIS — S93.492D SPRAIN OF ANTERIOR TALOFIBULAR LIGAMENT OF LEFT ANKLE, SUBSEQUENT ENCOUNTER: Primary | ICD-10-CM

## 2019-11-20 PROBLEM — S93.492A SPRAIN OF ANTERIOR TALOFIBULAR LIGAMENT OF LEFT ANKLE: Status: ACTIVE | Noted: 2019-08-07

## 2019-11-20 PROCEDURE — 99213 OFFICE O/P EST LOW 20 MIN: CPT | Performed by: PODIATRIST

## 2019-11-20 NOTE — LETTER
November 20, 2019     Patient: Ronel Morel   YOB: 1965   Date of Visit: 11/20/2019       To Whom it May Concern:    Ronel Morel is under my professional care  She was seen in my office on 11/20/2019  She may return to work on 12/2/2019  She has no restriction regarding her left ankle  She has recovered well from surgery  If you have any questions or concerns, please don't hesitate to call           Sincerely,          Lindsay Sweet DPM        CC: Ronel Morel

## 2019-11-20 NOTE — PROGRESS NOTES
Assessment/Plan:      Diagnoses and all orders for this visit:    Sprain of anterior talofibular ligament of left ankle, subsequent encounter    Closed fracture of left ankle with routine healing, subsequent encounter      patient is 3 months status post left ankle fracture repair of syndesmosis and anterior talofibular ligament  She is doing very well recovery wise regarding her ankle  She has good range of motion function and then I am unable to find any instability or pain in her left ankle today  At this point she is healed from the surgery and may advance as tolerated  She is very pleased with her result  She is stating she is having some right knee pain  I did discuss seen an orthopedist for this issue which she stated she has already made an appointment for  Patient is still having some swelling in the left ankle which is normal at 3 months  This can take sometimes up to a year to significantly improved  Patient where a small compression sock to left ankle which will help  I do not have any limitations other and then to advance as tolerated  She should continue physical therapy for at least 1 more month and at that point can decide whether she needs benefit from formal therapy or simply do exercises at home  At this point she has healed well from surgery and is very pleased  She may call me as needed  Subjective:     Patient ID: Jose Osullivan is a 47 y o  female  PAtient had left ankle surgery 8/16/19 (slightly over 3 months)  She has been recovering well  At last visit she could stand without any pain and was sent to PT  She has been going to [facility]  The therapy feels good, she is getting sneakers  Long term standing still gets very achy and swollen  She thinks the rehab is causing her to get right knee pain  For the most part she states her ankle feels strong but the rest of her body feels achy  Review of Systems   Constitutional: Negative      Musculoskeletal: Positive for arthralgias (Knee pain) and joint swelling  Negative for gait problem  Skin: Negative for color change  Neurological: Negative for numbness  Objective:     Physical Exam   Constitutional: She appears well-developed and well-nourished  No distress  Cardiovascular:   Pulses:       Dorsalis pedis pulses are 2+ on the right side, and 2+ on the left side  Posterior tibial pulses are 2+ on the right side, and 2+ on the left side  Musculoskeletal:        Left ankle: Normal  Achilles tendon normal         Left foot: There is normal range of motion and no deformity  Feet:    Feet:   Right Foot:   Protective Sensation: 10 sites tested  10 sites sensed  Skin Integrity: Negative for ulcer, blister, skin breakdown or erythema  Left Foot:   Protective Sensation: 10 sites tested  10 sites sensed  Skin Integrity: Negative for ulcer, blister, skin breakdown or erythema  Vitals reviewed

## 2020-02-01 ENCOUNTER — APPOINTMENT (EMERGENCY)
Dept: NON INVASIVE DIAGNOSTICS | Facility: HOSPITAL | Age: 55
End: 2020-02-01
Payer: COMMERCIAL

## 2020-02-01 ENCOUNTER — APPOINTMENT (EMERGENCY)
Dept: RADIOLOGY | Facility: HOSPITAL | Age: 55
End: 2020-02-01
Payer: COMMERCIAL

## 2020-02-01 ENCOUNTER — HOSPITAL ENCOUNTER (EMERGENCY)
Facility: HOSPITAL | Age: 55
Discharge: HOME | End: 2020-02-01
Attending: EMERGENCY MEDICINE | Admitting: EMERGENCY MEDICINE
Payer: COMMERCIAL

## 2020-02-01 VITALS
RESPIRATION RATE: 18 BRPM | DIASTOLIC BLOOD PRESSURE: 79 MMHG | TEMPERATURE: 98.5 F | OXYGEN SATURATION: 100 % | SYSTOLIC BLOOD PRESSURE: 155 MMHG | HEART RATE: 96 BPM

## 2020-02-01 DIAGNOSIS — M25.572 LEFT ANKLE PAIN: Primary | ICD-10-CM

## 2020-02-01 LAB
ANION GAP SERPL CALCULATED.3IONS-SCNC: 9 MMOL/L (ref 4–13)
APTT PPP: 29 SECONDS (ref 23–37)
BASOPHILS # BLD AUTO: 0.02 THOUSANDS/ΜL (ref 0–0.1)
BASOPHILS NFR BLD AUTO: 0 % (ref 0–1)
BUN SERPL-MCNC: 18 MG/DL (ref 5–25)
CALCIUM SERPL-MCNC: 9 MG/DL (ref 8.3–10.1)
CHLORIDE SERPL-SCNC: 105 MMOL/L (ref 100–108)
CO2 SERPL-SCNC: 30 MMOL/L (ref 21–32)
CREAT SERPL-MCNC: 1.01 MG/DL (ref 0.6–1.3)
EOSINOPHIL # BLD AUTO: 0.08 THOUSAND/ΜL (ref 0–0.61)
EOSINOPHIL NFR BLD AUTO: 1 % (ref 0–6)
ERYTHROCYTE [DISTWIDTH] IN BLOOD BY AUTOMATED COUNT: 13.8 % (ref 11.6–15.1)
GFR SERPL CREATININE-BSD FRML MDRD: 72 ML/MIN/1.73SQ M
GLUCOSE SERPL-MCNC: 99 MG/DL (ref 65–140)
HCT VFR BLD AUTO: 41.2 % (ref 34.8–46.1)
HGB BLD-MCNC: 12.6 G/DL (ref 11.5–15.4)
IMM GRANULOCYTES # BLD AUTO: 0.01 THOUSAND/UL (ref 0–0.2)
IMM GRANULOCYTES NFR BLD AUTO: 0 % (ref 0–2)
INR PPP: 0.95 (ref 0.84–1.19)
LYMPHOCYTES # BLD AUTO: 2.46 THOUSANDS/ΜL (ref 0.6–4.47)
LYMPHOCYTES NFR BLD AUTO: 31 % (ref 14–44)
MCH RBC QN AUTO: 26.9 PG (ref 26.8–34.3)
MCHC RBC AUTO-ENTMCNC: 30.6 G/DL (ref 31.4–37.4)
MCV RBC AUTO: 88 FL (ref 82–98)
MONOCYTES # BLD AUTO: 0.79 THOUSAND/ΜL (ref 0.17–1.22)
MONOCYTES NFR BLD AUTO: 10 % (ref 4–12)
NEUTROPHILS # BLD AUTO: 4.63 THOUSANDS/ΜL (ref 1.85–7.62)
NEUTS SEG NFR BLD AUTO: 58 % (ref 43–75)
NRBC BLD AUTO-RTO: 0 /100 WBCS
PLATELET # BLD AUTO: 257 THOUSANDS/UL (ref 149–390)
PMV BLD AUTO: 11 FL (ref 8.9–12.7)
POTASSIUM SERPL-SCNC: 3.8 MMOL/L (ref 3.5–5.3)
PROTHROMBIN TIME: 12.8 SECONDS (ref 11.6–14.5)
RBC # BLD AUTO: 4.69 MILLION/UL (ref 3.81–5.12)
SODIUM SERPL-SCNC: 144 MMOL/L (ref 136–145)
WBC # BLD AUTO: 7.99 THOUSAND/UL (ref 4.31–10.16)

## 2020-02-01 PROCEDURE — 85730 THROMBOPLASTIN TIME PARTIAL: CPT | Performed by: PHYSICIAN ASSISTANT

## 2020-02-01 PROCEDURE — 80048 BASIC METABOLIC PNL TOTAL CA: CPT | Performed by: PHYSICIAN ASSISTANT

## 2020-02-01 PROCEDURE — 85610 PROTHROMBIN TIME: CPT | Performed by: PHYSICIAN ASSISTANT

## 2020-02-01 PROCEDURE — 85025 COMPLETE CBC W/AUTO DIFF WBC: CPT | Performed by: PHYSICIAN ASSISTANT

## 2020-02-01 PROCEDURE — 93971 EXTREMITY STUDY: CPT

## 2020-02-01 PROCEDURE — 99284 EMERGENCY DEPT VISIT MOD MDM: CPT

## 2020-02-01 PROCEDURE — 36415 COLL VENOUS BLD VENIPUNCTURE: CPT | Performed by: PHYSICIAN ASSISTANT

## 2020-02-01 PROCEDURE — 99284 EMERGENCY DEPT VISIT MOD MDM: CPT | Performed by: EMERGENCY MEDICINE

## 2020-02-01 PROCEDURE — 73610 X-RAY EXAM OF ANKLE: CPT

## 2020-02-01 RX ORDER — HYDROCODONE BITARTRATE AND ACETAMINOPHEN 5; 325 MG/1; MG/1
1 TABLET ORAL ONCE
Status: COMPLETED | OUTPATIENT
Start: 2020-02-01 | End: 2020-02-01

## 2020-02-01 RX ADMIN — HYDROCODONE BITARTRATE AND ACETAMINOPHEN 1 TABLET: 5; 325 TABLET ORAL at 17:14

## 2020-02-01 NOTE — ED PROVIDER NOTES
History  Chief Complaint   Patient presents with    Ankle Pain     patient had ankle surgery in august  left  patient now having increased swelling and pain in that ankle     49-year-old female with history of GERD, diabetes, hypertension, who underwent left ankle syndesmotic and ATFL ligament repair in August 2019 presents emergency department for evaluation of left ankle pain  Patient reports that she has had appropriate progress in her physical therapy, and recently returned to work, where she is on her feet for 8-12 hours a day  Patient reports increasing pain and swelling to left ankle  Patient states she last saw podiatry in November, who states that it can take up to 1 year for the ankle swelling to resolve  Patient reports she has been elevating her ankle as much as possible, but still states there is pain  Patient reports Mathew Coy is something wrong, I just know it    Denies personal or family history of DVT/PE, hemoptysis, recent trauma/surgery (</= 4 weeks ago requiring general anesthesia), recent travel, cancer/cancer treatment (in last 6 months), exogenous estrogen use  Ankle Pain   Location:  Ankle  Time since incident:  4 months  Ankle location:  L ankle  Pain details:     Quality:  Pressure and throbbing    Radiates to:  Does not radiate    Severity:  Mild    Timing:  Constant    Progression:  Worsening  Dislocation: no    Relieved by:  Nothing  Worsened by: Activity and bearing weight  Ineffective treatments:  Ice, elevation and rest  Associated symptoms: decreased ROM and swelling    Associated symptoms: no back pain, no fatigue, no fever, no neck pain, no numbness, no stiffness and no tingling        Prior to Admission Medications   Prescriptions Last Dose Informant Patient Reported? Taking?    BLACK COHOSH HOT FLASH RELIEF PO   Yes No   Sig: Take 1 tablet by mouth daily   Cholecalciferol (VITAMIN D) 2000 units tablet   Yes No   Sig: Take 2,000 Units by mouth daily   DULoxetine (CYMBALTA) 30 mg delayed release capsule   Yes No   Sig: Take 30 mg by mouth daily at bedtime   Famotidine-Ca Carb-Mag Hydrox (PEPCID COMPLETE PO)   Yes No   Sig: Take by mouth as needed   Multiple Vitamin (MULTIVITAMIN) tablet   Yes No   Sig: Take 1 tablet by mouth daily   ibuprofen (MOTRIN) 800 mg tablet   Yes No   Sig: Take 800 mg by mouth as needed    lisinopril (ZESTRIL) 20 mg tablet   Yes No   Sig: Take 20 mg by mouth every morning    metFORMIN (GLUCOPHAGE) 500 mg tablet   Yes No   Sig: Take 500 mg by mouth daily with breakfast   traMADol (ULTRAM) 50 mg tablet   No No   Sig: Take 1 tablet (50 mg total) by mouth every 6 (six) hours as needed for moderate pain   vitamin E 600 UNIT capsule   Yes No   Sig: Take 600 Units by mouth daily      Facility-Administered Medications: None       Past Medical History:   Diagnosis Date    Anxiety     Back pain     Diabetes mellitus ([facility] [address] )     Pre-diabetes    Fracture     Left ankle   GERD (gastroesophageal reflux disease)     Hypertension     Impaired ambulation     Left ankle fracture/ Has walking boot, crutches or walker   PONV (postoperative nausea and vomiting)     Sleep apnea     Says  said she didn't need CPAP    Snores     Wears glasses        Past Surgical History:   Procedure Laterality Date    APPENDECTOMY      COLONOSCOPY      HYSTERECTOMY      Partial  Has ovaries    MA OPEN TX DISTAL TIBIOFIBULAR JOINT DISRUPTION Left 8/16/2019    Procedure: LEFT ANKLE SYNDESMOTIC LIGAMENT & ATFL REPAIR;  Surgeon: iJm Coffmna DPM;  Location: [facility];  Service: Podiatry       History reviewed  No pertinent family history  I have reviewed and agree with the history as documented  Social History     Tobacco Use    Smoking status: Former Smoker     Packs/day: 0 25     Years: 10 00     Pack years: 2 50     Types: Cigarettes    Smokeless tobacco: Never Used    Tobacco comment: Quit 25 years ago   Substance Use Topics    Alcohol use:  Yes Comment: social    Drug use: Never        Review of Systems   Constitutional: Negative for fatigue and fever  HENT: Negative for congestion and sore throat  Respiratory: Negative for cough, shortness of breath and wheezing  Cardiovascular: Positive for leg swelling  Negative for chest pain  Gastrointestinal: Negative for abdominal pain, diarrhea, nausea and vomiting  Genitourinary: Negative for decreased urine volume and dysuria  Musculoskeletal: Positive for arthralgias, gait problem and joint swelling  Negative for back pain, myalgias, neck pain, neck stiffness and stiffness  Skin: Negative for rash and wound  All other systems reviewed and are negative  Physical Exam  Physical Exam   Constitutional: She is oriented to person, place, and time  Vital signs are normal  She appears well-developed and well-nourished  Non-toxic appearance  No distress  HENT:   Head: Normocephalic and atraumatic  Right Ear: Hearing and external ear normal    Left Ear: Hearing and external ear normal    Nose: Nose normal  No mucosal edema  Mouth/Throat: Uvula is midline and mucous membranes are normal    Eyes: Conjunctivae are normal    Neck: Normal range of motion and full passive range of motion without pain  Neck supple  Cardiovascular: Normal rate, regular rhythm, S1 normal, S2 normal and normal heart sounds  Pulses:       Dorsalis pedis pulses are 2+ on the right side, and 2+ on the left side  Posterior tibial pulses are 2+ on the right side, and 2+ on the left side  Pulmonary/Chest: Effort normal and breath sounds normal  No tachypnea  No respiratory distress  She has no decreased breath sounds  She has no wheezes  She has no rhonchi  Abdominal: Soft  There is no tenderness  Musculoskeletal:        Left ankle: She exhibits decreased range of motion and swelling  She exhibits no ecchymosis, no deformity and normal pulse  Tenderness   Lateral malleolus and medial malleolus tenderness found  Achilles tendon exhibits no pain and normal Epreira's test results  Left lower leg: She exhibits tenderness, swelling and edema  She exhibits no deformity  Legs:       Feet:    2+ PT & DP pulses, good cap refill on the left  Neurological: She is alert and oriented to person, place, and time  GCS eye subscore is 4  GCS verbal subscore is 5  GCS motor subscore is 6  Skin: Skin is warm and dry  Capillary refill takes less than 2 seconds  No rash noted  Nursing note and vitals reviewed        Vital Signs  ED Triage Vitals   Temperature Pulse Respirations Blood Pressure SpO2   02/01/20 1650 02/01/20 1642 02/01/20 1642 02/01/20 1642 02/01/20 1642   98 5 °F (36 9 °C) 96 18 155/79 100 %      Temp Source Heart Rate Source Patient Position - Orthostatic VS BP Location FiO2 (%)   02/01/20 1650 02/01/20 1642 02/01/20 1642 02/01/20 1642 --   Oral Monitor Sitting Left arm       Pain Score       02/01/20 1642       Worst Possible Pain           Vitals:    02/01/20 1642   BP: 155/79   Pulse: 96   Patient Position - Orthostatic VS: Sitting         Visual Acuity      ED Medications  Medications   HYDROcodone-acetaminophen (NORCO) 5-325 mg per tablet 1 tablet (1 tablet Oral Given 2/1/20 1714)       Diagnostic Studies  Results Reviewed     Procedure Component Value Units Date/Time    Protime-INR [[id number]]  (Normal) Collected:  02/01/20 1724    Lab Status:  Final result Specimen:  Blood from Arm, Right Updated:  02/01/20 1743     Protime 12 8 seconds      INR 0 95    APTT [[id number]]  (Normal) Collected:  02/01/20 1724    Lab Status:  Final result Specimen:  Blood from Arm, Right Updated:  02/01/20 1743     PTT 29 seconds     Basic metabolic panel [[id number]] Collected:  02/01/20 1724    Lab Status:  Final result Specimen:  Blood from Arm, Right Updated:  02/01/20 1739     Sodium 144 mmol/L      Potassium 3 8 mmol/L      Chloride 105 mmol/L      CO2 30 mmol/L      ANION GAP 9 mmol/L      BUN 18 mg/dL Creatinine 1 01 mg/dL      Glucose 99 mg/dL      Calcium 9 0 mg/dL      eGFR 72 ml/min/1 73sq m     Narrative:       National Kidney Disease Foundation guidelines for Chronic Kidney Disease (CKD):     Stage 1 with normal or high GFR (GFR > 90 mL/min/1 73 square meters)    Stage 2 Mild CKD (GFR = 60-89 mL/min/1 73 square meters)    Stage 3A Moderate CKD (GFR = 45-59 mL/min/1 73 square meters)    Stage 3B Moderate CKD (GFR = 30-44 mL/min/1 73 square meters)    Stage 4 Severe CKD (GFR = 15-29 mL/min/1 73 square meters)    Stage 5 End Stage CKD (GFR <15 mL/min/1 73 square meters)  Note: GFR calculation is accurate only with a steady state creatinine    CBC and differential [[id number]]  (Abnormal) Collected:  02/01/20 1724    Lab Status:  Final result Specimen:  Blood from Arm, Right Updated:  02/01/20 1731     WBC 7 99 Thousand/uL      RBC 4 69 Million/uL      Hemoglobin 12 6 g/dL      Hematocrit 41 2 %      MCV 88 fL      MCH 26 9 pg      MCHC 30 6 g/dL      RDW 13 8 %      MPV 11 0 fL      Platelets 956 Thousands/uL      nRBC 0 /100 WBCs      Neutrophils Relative 58 %      Immat GRANS % 0 %      Lymphocytes Relative 31 %      Monocytes Relative 10 %      Eosinophils Relative 1 %      Basophils Relative 0 %      Neutrophils Absolute 4 63 Thousands/µL      Immature Grans Absolute 0 01 Thousand/uL      Lymphocytes Absolute 2 46 Thousands/µL      Monocytes Absolute 0 79 Thousand/µL      Eosinophils Absolute 0 08 Thousand/µL      Basophils Absolute 0 02 Thousands/µL                  VAS lower limb venous duplex study, unilateral/limited   Final Result by Jessica Wan MD (02/02 1340)      XR ankle 3+ views LEFT   ED Interpretation by Sulaiman Hassan PA-C (02/01 1731)   Preliminary read:  Left ankle hardware, no acute osseous abnormality  Final Result by Alejandra Long MD (02/02 1054)      No acute osseous abnormality        Syndesmosis repair hardware about the fibula and tibia without hardware complication  Workstation performed: [id number]                    Procedures  Procedures         ED Course  ED Course as of Feb 04 1619   Sat Feb 01, 2020   1710 Due to degree of pain and ankle and calf swelling, will do labs, vascular US to r/o DVT  1739 WBC: 7 99   3208 Basic metabolic panel   4352 PTT: 29   1745 Protime: 12 8   1745 INR: 0 95   1745 Per vascular tech, vascular study negative                                  MDM  Number of Diagnoses or Management Options  Left ankle pain:   Diagnosis management comments: 66-year-old female status post ankle surgery in August, with persistent pain and swelling  Patient reports increasing in amount of swelling acutely last 4 days, vascular duplex study was obtained, which did not show evidence of a DVT  Patient does not have tachycardia, chest pain, shortness of breath  X-ray left ankle shows no change in hardware, or any new fractures  Instructed patient to elevate as much as possible after work, and follow-up with podiatry in 2-3 days  Counseled patient on importance of rest, ice, compression, elevation  Work note provided  The management plan was discussed in detail with the patient at bedside and all questions were answered  The prior to discharge, we provided both verbal and written instructions  We discussed with the patient the signs and symptoms for which to return to the emergency department  All questions were answered and patient was comfortable with the plan of care and discharged to home  Instructed the patient to follow up with the primary care provider and/or special as provided and their written instructions  The patient verbalized understanding of our discussion and plan of care, and agrees to return to the Emergency Department for concerns and progression of illness            Disposition  Final diagnoses:   Left ankle pain     Time reflects when diagnosis was documented in both MDM as applicable and the Disposition within this note     Time User Action Codes Description Comment    2/1/2020  6:03 PM 4200 Karlos Haas [[id number]] Left ankle pain       ED Disposition     ED Disposition Condition Date/Time Comment    Discharge Stable Sat Feb 1, 2020  6:03 PM Jaime Loyola discharge to home/self care  Follow-up Information     Follow up With Specialties Details Why Contact Info    Cici Reddy MD Family Medicine Schedule an appointment as soon as possible for a visit in 3 days  [facility] [address]      [place], [address] [facility], [address]   Call in 2 days  [address],[address]  [phone]            Discharge Medication List as of 2/1/2020  6:06 PM      CONTINUE these medications which have NOT CHANGED    Details   BLACK COHOSH HOT FLASH RELIEF PO Take 1 tablet by mouth daily, Historical Med      Cholecalciferol (VITAMIN D) 2000 units tablet Take 2,000 Units by mouth daily, Historical Med      DULoxetine (CYMBALTA) 30 mg delayed release capsule Take 30 mg by mouth daily at bedtime, Historical Med      Famotidine-Ca Carb-Mag Hydrox (PEPCID COMPLETE PO) Take by mouth as needed, Historical Med      ibuprofen (MOTRIN) 800 mg tablet Take 800 mg by mouth as needed , Starting Tue 7/2/2019, Historical Med      lisinopril (ZESTRIL) 20 mg tablet Take 20 mg by mouth every morning , Historical Med      metFORMIN (GLUCOPHAGE) 500 mg tablet Take 500 mg by mouth daily with breakfast, Historical Med      Multiple Vitamin (MULTIVITAMIN) tablet Take 1 tablet by mouth daily, Historical Med      traMADol (ULTRAM) 50 mg tablet Take 1 tablet (50 mg total) by mouth every 6 (six) hours as needed for moderate pain, Starting u 7/18/2019, Print      vitamin E 600 UNIT capsule Take 600 Units by mouth daily, Historical Med           No discharge procedures on file      ED Provider  Electronically Signed by           Katherin Coffman PA-C  02/04/20 0156

## 2020-02-02 PROCEDURE — 93971 EXTREMITY STUDY: CPT | Performed by: SURGERY

## 2020-02-05 ENCOUNTER — OFFICE VISIT (OUTPATIENT)
Dept: PODIATRY | Facility: CLINIC | Age: 55
End: 2020-02-05
Payer: COMMERCIAL

## 2020-02-05 VITALS
HEIGHT: 61 IN | HEART RATE: 89 BPM | WEIGHT: 170 LBS | DIASTOLIC BLOOD PRESSURE: 75 MMHG | BODY MASS INDEX: 32.1 KG/M2 | SYSTOLIC BLOOD PRESSURE: 140 MMHG

## 2020-02-05 DIAGNOSIS — M25.572 ACUTE LEFT ANKLE PAIN: Primary | ICD-10-CM

## 2020-02-05 DIAGNOSIS — S93.492S SPRAIN OF ANTERIOR TALOFIBULAR LIGAMENT OF LEFT ANKLE, SEQUELA: ICD-10-CM

## 2020-02-05 PROCEDURE — 20605 DRAIN/INJ JOINT/BURSA W/O US: CPT | Performed by: PODIATRIST

## 2020-02-05 PROCEDURE — 99213 OFFICE O/P EST LOW 20 MIN: CPT | Performed by: PODIATRIST

## 2020-02-05 RX ORDER — TRIAMCINOLONE ACETONIDE 40 MG/ML
40 INJECTION, SUSPENSION INTRA-ARTICULAR; INTRAMUSCULAR ONCE
Status: COMPLETED | OUTPATIENT
Start: 2020-02-05 | End: 2020-02-05

## 2020-02-05 RX ORDER — LIDOCAINE HYDROCHLORIDE 10 MG/ML
2 INJECTION, SOLUTION INFILTRATION; PERINEURAL ONCE
Status: COMPLETED | OUTPATIENT
Start: 2020-02-05 | End: 2020-02-05

## 2020-02-05 RX ADMIN — TRIAMCINOLONE ACETONIDE 40 MG: 40 INJECTION, SUSPENSION INTRA-ARTICULAR; INTRAMUSCULAR at 13:23

## 2020-02-05 RX ADMIN — LIDOCAINE HYDROCHLORIDE 2 ML: 10 INJECTION, SOLUTION INFILTRATION; PERINEURAL at 13:23

## 2020-02-05 NOTE — PATIENT INSTRUCTIONS
Ankle Exercises   AMBULATORY CARE:   What you need to know about ankle exercises: Ankle exercises help strengthen your ankle and improve its function after injury  These are beginning exercises  Ask your healthcare provider if you need to see a physical therapist for more advanced exercises  · Do these exercises 3 to 5 days a week , or as directed by your healthcare provider  Ask if you should perform the exercises on each ankle  · Do the exercises in the order that your healthcare provider recommends  This will help prevent swelling, chronic pain, and reinjury  Start with range of motion exercises  Then progress to strengthening exercises, and finally to balancing exercises  · Warm up before you do ankle exercises  Walk or ride a stationary bike for 5 to 10 minutes to prepare your ankle for movement  · Stop if you feel pain  It is normal to feel some discomfort at first  Regular exercise will help decrease your discomfort over time  How to perform range of motion exercises safely:  Begin with range of motion exercises to improve flexibility  Ask your healthcare provider when you can progress to strengthening exercises  · Ankle alphabet:  Sit on a chair so that your feet do not touch the floor  Use your big toe to write each letter of the alphabet  Use only your foot and ankle, and keep your movements small  Do 2 sets  · Calf stretches:      ¨ Sitting calf stretches with a towel:  Sit on the floor with both legs out straight in front of you  Loop a towel around the ball of your injured foot  Grasp the ends of the towel and pull it toward you  Keep your leg and back straight  Do not lean forward as you pull the towel  Hold for 30 seconds  Then relax for 30 seconds  Do 2 sets of 10  ¨ Standing calf stretches:  Stand facing a wall with the foot that is not injured forward and your knee slightly bent   Keep the leg with the injured foot straight and behind you with your toes pointed in slightly  With both heels flat on the floor, press your hips forward  Do not arch your back  Hold for 30 seconds, and then relax for 30 seconds  Do 2 sets of 10  Repeat with your leg bent  Do 2 sets of 10  How to perform strengthening exercises safely:  After you can perform range of motion exercises without pain, you may begin strengthening exercises  Ask your healthcare provider when you can progress to balancing exercises  · Ankle movement in 4 directions:  Sit on the floor with your legs straight in front of you  Keep your heels on the floor for support  ¨ Dorsiflexion:  Begin with your toes pointing straight up  Pull your toes toward your body  Slowly return to the starting position  Do 3 sets of 5      ¨ Plantar flexion:  Begin with your toes pointing straight up  Push your toes away from your body  Slowly return to the starting position  Do 3 sets of 5            ¨ Inversion:  Begin with your toes pointing straight up  Push your toes inward, toward each other  Slowly return to the starting position  Do 3 sets of 5      ¨ Eversion:  Begin with your toes pointing straight up  Push your toes outward, away from each other  Slowly return to the starting position  Do 3 sets of 5          · Toe curls with a towel:  Sit on a chair so that both of your feet are flat on the floor  Place a small towel on the floor in front of your injured foot  Grab the center of the towel with your toes and curl the towel toward you  Relax and repeat  Do 1 set of 5            · [place] pick-ups:  Sit on a chair so that both of your feet are flat on the floor  Place 20 marbles on the floor in front of your injured foot  Use your toes to  one marble at a time and place it into a bowl  Repeat until you have picked up all the marbles  Do 1 set  · Heel raises:      ¨ Single leg heel raises:  Stand with your weight evenly on both feet  Hold on to a chair or a wall for balance   Lift the foot that is not injured off the floor so all your weight is placed on your injured foot  Raise the heel of your injured foot as high as you can  Slowly lower your heel to the floor  Do 1 set of 10  ¨ Double leg heel raises:  Stand with your weight evenly on both feet  Hold on to a chair or a wall for balance  Raise both of your heels as high as you can  Slowly lower your heels to the floor  Do 1 set of 10  · Heel and toe walks:      ¨ Heel walks:  Begin in a standing position  Lift your toes off the floor and walk on your heels  Keep your toes lifted as high as possible  Do 2 sets of 10  ¨ Toe walks:  Begin in a standing position  Lift your heels off the floor and walk on the balls and toes of your feet  Keep your heels lifted as high as possible  Do 2 sets of 10  How to perform a balance exercise safely:  After you can perform strengthening exercises without pain, you may do this beginning balancing exercise  Ask your healthcare provider for more advanced balance exercises  · Single leg stance:  Stand with your weight evenly on both feet, or hold on to a chair or a wall  Do not lean to the side  Lift the foot that is not injured off the floor so all your weight is placed on your injured foot  Balance on your injured foot  Ask your healthcare provider how long to hold this position  Contact your healthcare provider if:   · Your pain becomes worse  · You have new pain  · You have questions or concerns about your condition, care, or exercise program   © 2017 [address] [employer] Information is for End User's use only and may not be sold, redistributed or otherwise used for commercial purposes  All illustrations and images included in CareNotes® are the copyrighted property of [employer] , [employer]  or Jean Pierre Saavedra  The above information is an  only  It is not intended as medical advice for individual conditions or treatments   Talk to your doctor, nurse or pharmacist before following any medical regimen to see if it is safe and effective for you

## 2020-02-05 NOTE — PROGRESS NOTES
Assessment/Plan:       Diagnoses and all orders for this visit:    Acute left ankle pain  Comments:  Seems to be acute sprain vs complication with previous surgery  Negative drawer and no pain with syndesmotic stress  Mostly anterior ankle pain  Orders:  -     triamcinolone acetonide (KENALOG-40) 40 mg/mL injection 40 mg  -     lidocaine (XYLOCAINE) 1 % injection 2 mL  -     Medium joint arthrocentesis: L ankle    Sprain of anterior talofibular ligament of left ankle, sequela  -     Medium joint arthrocentesis: L ankle    Other orders  -     Cancel: Small joint arthrocentesis      patient has acute pain in her left ankle for approximately 1 week  This may have been aggravated from overuse  She has not had any pain in this ankle since her surgery last August and recovered well throughout September and October  She has been at a high functioning ability since that time  On physical exam I find no instability of the ankle joint in fact she has no pain at the ankle syndesmosis or lateral ankle ligaments  She does seem to have some tenderness at the hardware along the fibula but most of her pain is at the anterior ankle made worse with stress dorsiflexion and use of the extensor tendons  This seems just a simple overuse injury and sprain  She may have postoperative synovitis within the ankle  A steroid injection within the joint did provide some pain relief almost immediately  I would like her to begin home therapy exercises and rest for 2 weeks on light duty  Endo was given  I would like to check her progress in a few weeks  I reviewed a recent x-ray and there is no cause for concern with the retained hardware  The ankle joint does appear anatomic      Medium joint arthrocentesis: L ankle  Date/Time: 2/5/2020 11:09 AM  Consent given by: patient  Site marked: site marked  Timeout: Immediately prior to procedure a time out was called to verify the correct patient, procedure, equipment, support staff and site/side marked as required   Supporting Documentation  Indications: pain and diagnostic evaluation   Procedure Details  Location: ankle - L ankle  Needle size: 25 G  Ultrasound guidance: no  Approach: anterolateral  Medication group details: 0 5cc kenalog 40, 2cc 1% lidocaine plain  Pain in ankle did improve within minutes of injection            Subjective:      Patient ID: Michelle Mitchell is a 54 y o  female  Patient had left ankle ligament reconstruction last August and healed without complication  She has been back at work and doing well  About a week ago her left ankle got very painful again  She is getting lateral and anterior ankle pain and some swelling, She went to the ED over the weekend and got an XRay  She was given a pain killer which mildly helped  The following portions of the patient's history were reviewed and updated as appropriate: allergies, current medications, past family history, past medical history, past social history, past surgical history and problem list     Review of Systems   Constitutional: Negative  Respiratory: Negative for cough and shortness of breath  Cardiovascular: Positive for leg swelling  Gastrointestinal: Negative for diarrhea and nausea  Musculoskeletal: Positive for arthralgias and joint swelling  Skin: Negative for color change and wound  Neurological: Negative for numbness  Objective:      /75   Pulse 89   Ht 5' 1" (1 549 m)   Wt 77 1 kg (170 lb)   BMI 32 12 kg/m²          Physical Exam   Constitutional: She is oriented to person, place, and time  She appears well-developed and well-nourished  She appears distressed  Cardiovascular: Intact distal pulses  Pulmonary/Chest: Effort normal  No respiratory distress  Abdominal: There is no tenderness  Musculoskeletal:        Left ankle: She exhibits no swelling, no deformity, no laceration and normal pulse  Tenderness   Lateral malleolus (Tenderness over the retained hardware along the distal fibula and lateral malleolus ) tenderness found  No AITFL, no CF ligament and no proximal fibula tenderness found  Achilles tendon normal         Feet:    Neurological: She is alert and oriented to person, place, and time  No sensory deficit  Skin: Skin is warm and dry  Capillary refill takes less than 2 seconds  Psychiatric: She has a normal mood and affect  Her behavior is normal    Vitals reviewed  Patient had x-ray in the emergency department last week which shows stable anatomic alignment of the ankle as well as stable retained hardware  There are no changes from patient's postoperative x-rays  Normal radiograph

## 2020-02-05 NOTE — ASSESSMENT & PLAN NOTE
Seems to be acute sprain vs complication with previous surgery  Negative drawer and no pain with syndesmotic stress   Mostly anterior ankle pain

## 2020-02-06 ENCOUNTER — TELEPHONE (OUTPATIENT)
Dept: PODIATRY | Facility: CLINIC | Age: 55
End: 2020-02-06

## 2020-05-27 ENCOUNTER — OFFICE VISIT (OUTPATIENT)
Dept: PODIATRY | Facility: CLINIC | Age: 55
End: 2020-05-27
Payer: COMMERCIAL

## 2020-05-27 VITALS — WEIGHT: 170 LBS | BODY MASS INDEX: 32.1 KG/M2 | HEIGHT: 61 IN

## 2020-05-27 DIAGNOSIS — G89.29 CHRONIC PAIN OF LEFT ANKLE: Primary | ICD-10-CM

## 2020-05-27 DIAGNOSIS — S93.422D SPRAIN OF DELTOID LIGAMENT OF LEFT ANKLE, SUBSEQUENT ENCOUNTER: ICD-10-CM

## 2020-05-27 DIAGNOSIS — M25.572 CHRONIC PAIN OF LEFT ANKLE: Primary | ICD-10-CM

## 2020-05-27 DIAGNOSIS — M65.9 SYNOVITIS OF LEFT ANKLE: ICD-10-CM

## 2020-05-27 PROCEDURE — 99213 OFFICE O/P EST LOW 20 MIN: CPT | Performed by: PODIATRIST

## 2020-05-27 RX ORDER — NAPROXEN 500 MG/1
500 TABLET ORAL 2 TIMES DAILY WITH MEALS
Qty: 30 TABLET | Refills: 4 | Status: SHIPPED | OUTPATIENT
Start: 2020-05-27

## 2020-06-09 ENCOUNTER — HOSPITAL ENCOUNTER (OUTPATIENT)
Dept: MRI IMAGING | Facility: HOSPITAL | Age: 55
Discharge: HOME | End: 2020-06-09
Attending: PODIATRIST
Payer: COMMERCIAL

## 2020-06-09 DIAGNOSIS — M65.9 SYNOVITIS OF LEFT ANKLE: ICD-10-CM

## 2020-06-09 DIAGNOSIS — G89.29 CHRONIC PAIN OF LEFT ANKLE: ICD-10-CM

## 2020-06-09 DIAGNOSIS — M25.572 CHRONIC PAIN OF LEFT ANKLE: ICD-10-CM

## 2020-06-09 DIAGNOSIS — S93.422D SPRAIN OF DELTOID LIGAMENT OF LEFT ANKLE, SUBSEQUENT ENCOUNTER: ICD-10-CM

## 2020-06-09 PROCEDURE — 73721 MRI JNT OF LWR EXTRE W/O DYE: CPT

## 2020-06-17 ENCOUNTER — OFFICE VISIT (OUTPATIENT)
Dept: PODIATRY | Facility: CLINIC | Age: 55
End: 2020-06-17
Payer: COMMERCIAL

## 2020-06-17 VITALS — BODY MASS INDEX: 32.1 KG/M2 | HEIGHT: 61 IN | WEIGHT: 170 LBS

## 2020-06-17 DIAGNOSIS — G89.29 CHRONIC PAIN OF LEFT ANKLE: Primary | ICD-10-CM

## 2020-06-17 DIAGNOSIS — M25.572 CHRONIC PAIN OF LEFT ANKLE: Primary | ICD-10-CM

## 2020-06-17 DIAGNOSIS — M65.9 SYNOVITIS OF LEFT ANKLE: ICD-10-CM

## 2020-06-17 PROCEDURE — 99213 OFFICE O/P EST LOW 20 MIN: CPT | Performed by: PODIATRIST

## 2020-06-17 RX ORDER — METHYLPREDNISOLONE 4 MG/1
TABLET ORAL
Qty: 1 EACH | Refills: 0 | Status: SHIPPED | OUTPATIENT
Start: 2020-06-17

## 2020-06-25 ENCOUNTER — EVALUATION (OUTPATIENT)
Dept: PHYSICAL THERAPY | Facility: MEDICAL CENTER | Age: 55
End: 2020-06-25
Payer: COMMERCIAL

## 2020-06-25 DIAGNOSIS — M25.572 CHRONIC PAIN OF LEFT ANKLE: Primary | ICD-10-CM

## 2020-06-25 DIAGNOSIS — M65.9 SYNOVITIS OF LEFT ANKLE: ICD-10-CM

## 2020-06-25 DIAGNOSIS — G89.29 CHRONIC PAIN OF LEFT ANKLE: Primary | ICD-10-CM

## 2020-06-25 PROCEDURE — 97161 PT EVAL LOW COMPLEX 20 MIN: CPT | Performed by: PHYSICAL THERAPIST

## 2020-06-30 ENCOUNTER — OFFICE VISIT (OUTPATIENT)
Dept: PHYSICAL THERAPY | Facility: MEDICAL CENTER | Age: 55
End: 2020-06-30
Payer: COMMERCIAL

## 2020-06-30 DIAGNOSIS — M65.9 SYNOVITIS OF LEFT ANKLE: ICD-10-CM

## 2020-06-30 DIAGNOSIS — G89.29 CHRONIC PAIN OF LEFT ANKLE: Primary | ICD-10-CM

## 2020-06-30 DIAGNOSIS — M25.572 CHRONIC PAIN OF LEFT ANKLE: Primary | ICD-10-CM

## 2020-06-30 PROCEDURE — 97110 THERAPEUTIC EXERCISES: CPT | Performed by: PHYSICAL THERAPIST

## 2020-06-30 PROCEDURE — 97010 HOT OR COLD PACKS THERAPY: CPT | Performed by: PHYSICAL THERAPIST

## 2020-06-30 PROCEDURE — 97140 MANUAL THERAPY 1/> REGIONS: CPT | Performed by: PHYSICAL THERAPIST

## 2020-06-30 PROCEDURE — 97112 NEUROMUSCULAR REEDUCATION: CPT | Performed by: PHYSICAL THERAPIST

## 2020-07-02 ENCOUNTER — OFFICE VISIT (OUTPATIENT)
Dept: PHYSICAL THERAPY | Facility: MEDICAL CENTER | Age: 55
End: 2020-07-02
Payer: COMMERCIAL

## 2020-07-02 DIAGNOSIS — M25.572 CHRONIC PAIN OF LEFT ANKLE: Primary | ICD-10-CM

## 2020-07-02 DIAGNOSIS — G89.29 CHRONIC PAIN OF LEFT ANKLE: Primary | ICD-10-CM

## 2020-07-02 DIAGNOSIS — M65.9 SYNOVITIS OF LEFT ANKLE: ICD-10-CM

## 2020-07-02 PROCEDURE — 97112 NEUROMUSCULAR REEDUCATION: CPT | Performed by: PHYSICAL THERAPIST

## 2020-07-02 PROCEDURE — 97110 THERAPEUTIC EXERCISES: CPT | Performed by: PHYSICAL THERAPIST

## 2020-07-02 PROCEDURE — 97140 MANUAL THERAPY 1/> REGIONS: CPT | Performed by: PHYSICAL THERAPIST

## 2020-07-02 NOTE — PROGRESS NOTES
Daily Note     Today's date: 2020  Patient name: Sondra Wright  : 1965  MRN: [id number]  Referring provider: [facility]  Dx:   Encounter Diagnosis     ICD-10-CM    1  Chronic pain of left ankle M25 572     G89 29    2  Synovitis of left ankle M65 9                   Subjective: Patient reports mild soreness following previous treatment session, specifically at the anterior ankle  She states she has been practicing her home exercises to minimize compensation of her toe extensors  Objective: See treatment diary below      Assessment: Patient tolerated treatment session well  Patient improving in ankle ROM  Decreased swelling, decreased pain  Improved motor control with ankle active motion in all planes  Patient instructed in DF stretch to add for home as most of her discomfort and tightness is at the DF tendons  Patient offered ice but patient declined and would ice at home later if needed  Plan: Continue per plan of care  Progress treatment as tolerated  Precautions: HTN, DM      Manuals  7/          Retrograde massage nv x5' x5'          TC joint mobs nv Gr   III Gr  III          AAROM left ankle nv x5' x5'          Left ankle isometrics  x3' x3'          Neuro Re-Ed             Ankle ABC's nv x2 x2'          BAP's board NWB nv nv                                                                            Ther Ex             Ankle AROM 4 way nv 2x10 ea 3x10 ea          Calf stretch (KF, KE) nv 30"x4 ea 30"x4 ea          Towel bunches nv x2' x2'          Seated hell slides  10" x10 10" x10          Seated PF/DF   2x10 ea                                                 Ther Activity                                       Gait Training                                       Modalities             CP L ankle post treatment PRN  x10' deferred

## 2020-07-07 ENCOUNTER — OFFICE VISIT (OUTPATIENT)
Dept: PHYSICAL THERAPY | Facility: MEDICAL CENTER | Age: 55
End: 2020-07-07
Payer: COMMERCIAL

## 2020-07-07 DIAGNOSIS — M65.9 SYNOVITIS OF LEFT ANKLE: ICD-10-CM

## 2020-07-07 DIAGNOSIS — G89.29 CHRONIC PAIN OF LEFT ANKLE: Primary | ICD-10-CM

## 2020-07-07 DIAGNOSIS — M25.572 CHRONIC PAIN OF LEFT ANKLE: Primary | ICD-10-CM

## 2020-07-07 PROCEDURE — 97110 THERAPEUTIC EXERCISES: CPT | Performed by: PHYSICAL THERAPIST

## 2020-07-07 PROCEDURE — 97140 MANUAL THERAPY 1/> REGIONS: CPT | Performed by: PHYSICAL THERAPIST

## 2020-07-07 PROCEDURE — 97112 NEUROMUSCULAR REEDUCATION: CPT | Performed by: PHYSICAL THERAPIST

## 2020-07-07 NOTE — PROGRESS NOTES
Daily Note     Today's date: 2020  Patient name: Sunday Sanchez  : 1965  MRN: [id number]  Referring provider: [facility]  Dx:   Encounter Diagnosis     ICD-10-CM    1  Chronic pain of left ankle M25 572     G89 29    2  Synovitis of left ankle M65 9                   Subjective: Patient reports that she had some soreness after last session that subsided after 3-4 hours  Objective: See treatment diary below      Assessment: Patient demonstrated a positive response to manual interventions with improved ankle AROM in all planes  Continued with retrograde STM to address remaining swelling  All exercises performed in a pain-free range  Patient declined CP at end of session  Patient would benefit from continued PT to improve ankle mobility and strength to maximize function  Plan: Continue per plan of care  Precautions: HTN, DM      Manuals          Retrograde massage nv x5' x5' x5'         TC joint mobs nv Gr   III Gr  III Gr  III         AAROM left ankle nv x5' x5' x5'         Left ankle isometrics  x3' x3' x3'         Neuro Re-Ed             Ankle ABC's nv x2 x2' x2'         BAP's board NWB nv nv                                                                            Ther Ex             Ankle AROM 4 way nv 2x10 ea 3x10 ea 3x10 ea         Calf stretch (KF, KE) nv 30"x4 ea 30"x4 ea 30"x4 ea         Towel bunches nv x2' x2' x2'         Seated hell slides  10" x10 10" x10 10" x10         Seated PF/DF   2x10 ea 2x10 ea                                                Ther Activity                                       Gait Training                                       Modalities             CP L ankle post treatment PRN  x10' deferred deferred

## 2020-07-09 ENCOUNTER — APPOINTMENT (OUTPATIENT)
Dept: PHYSICAL THERAPY | Facility: MEDICAL CENTER | Age: 55
End: 2020-07-09
Payer: COMMERCIAL

## 2020-07-14 ENCOUNTER — OFFICE VISIT (OUTPATIENT)
Dept: PHYSICAL THERAPY | Facility: MEDICAL CENTER | Age: 55
End: 2020-07-14
Payer: COMMERCIAL

## 2020-07-14 DIAGNOSIS — M25.572 CHRONIC PAIN OF LEFT ANKLE: Primary | ICD-10-CM

## 2020-07-14 DIAGNOSIS — M65.9 SYNOVITIS OF LEFT ANKLE: ICD-10-CM

## 2020-07-14 DIAGNOSIS — G89.29 CHRONIC PAIN OF LEFT ANKLE: Primary | ICD-10-CM

## 2020-07-14 PROCEDURE — 97010 HOT OR COLD PACKS THERAPY: CPT | Performed by: PHYSICAL THERAPIST

## 2020-07-14 PROCEDURE — 97112 NEUROMUSCULAR REEDUCATION: CPT | Performed by: PHYSICAL THERAPIST

## 2020-07-14 PROCEDURE — 97110 THERAPEUTIC EXERCISES: CPT | Performed by: PHYSICAL THERAPIST

## 2020-07-14 PROCEDURE — 97140 MANUAL THERAPY 1/> REGIONS: CPT | Performed by: PHYSICAL THERAPIST

## 2020-07-14 NOTE — PROGRESS NOTES
Daily Note     Today's date: 2020  Patient name: Anna Campbell  : 1965  MRN: [id number]  Referring provider: [facility]  Dx:   Encounter Diagnosis     ICD-10-CM    1  Chronic pain of left ankle M25 572     G89 29    2  Synovitis of left ankle M65 9                   Subjective: Patient states her swelling is improved but continues to have pain with walking  Pain is primarily anterior near EHL  Objective: See treatment diary below      Assessment: Decreased swelling post manual interveventions  Patient appears to have anterior ankle impingement- addressed with MWM with moderate improvements  Improved motor control at the ankle demonstrated with ABC's  Added BAPs board for further NMRE and WBing SLS and tandem walking at support  Patient denied any increased pain but was fatigued  Overall, patient ambulating better  She will benefit from continued PT  Plan: Continue per plan of care  Precautions: HTN, DM      Manuals         Retrograde massage nv x5' x5' x5' x5'        TC joint mobs nv Gr   III Gr  III Gr  III DF MWM x10        AAROM left ankle nv x5' x5' x5'         Left ankle isometrics  x3' x3' x3' x5'        Neuro Re-Ed             Ankle ABC's nv x2 x2' x2' x3        BAP's board NWB nv nv   L3 x20 ea        Tandem walking     10 steps at 4 at support        SLS     10"x5 at support                                               Ther Ex             Ankle AROM 4 way nv 2x10 ea 3x10 ea 3x10 ea HEP        Calf stretch (KF, KE) nv 30"x4 ea 30"x4 ea 30"x4 ea 30"x4 ea        Towel bunches nv x2' x2' x2' x2'        Seated hell slides  10" x10 10" x10 10" x10 10" x10        Seated PF/DF   2x10 ea 2x10 ea 3x10 ea                                               Ther Activity                                       Gait Training                                       Modalities             CP L ankle post treatment PRN  x10' deferred deferred x10'

## 2020-07-16 ENCOUNTER — APPOINTMENT (OUTPATIENT)
Dept: PHYSICAL THERAPY | Facility: MEDICAL CENTER | Age: 55
End: 2020-07-16
Payer: COMMERCIAL

## 2020-07-21 ENCOUNTER — OFFICE VISIT (OUTPATIENT)
Dept: PHYSICAL THERAPY | Facility: MEDICAL CENTER | Age: 55
End: 2020-07-21
Payer: COMMERCIAL

## 2020-07-21 DIAGNOSIS — G89.29 CHRONIC PAIN OF LEFT ANKLE: Primary | ICD-10-CM

## 2020-07-21 DIAGNOSIS — M65.9 SYNOVITIS OF LEFT ANKLE: ICD-10-CM

## 2020-07-21 DIAGNOSIS — M25.572 CHRONIC PAIN OF LEFT ANKLE: Primary | ICD-10-CM

## 2020-07-21 PROCEDURE — 97140 MANUAL THERAPY 1/> REGIONS: CPT | Performed by: PHYSICAL THERAPIST

## 2020-07-21 PROCEDURE — 97110 THERAPEUTIC EXERCISES: CPT | Performed by: PHYSICAL THERAPIST

## 2020-07-21 PROCEDURE — 97112 NEUROMUSCULAR REEDUCATION: CPT | Performed by: PHYSICAL THERAPIST

## 2020-07-21 NOTE — PROGRESS NOTES
Daily Note     Today's date: 2020  Patient name: Paulette Perea  : 1965  MRN: [id number]  Referring provider: [facility]  Dx:   Encounter Diagnosis     ICD-10-CM    1  Chronic pain of left ankle M25 572     G89 29    2  Synovitis of left ankle M65 9                   Subjective: Patient states she was sore following previous treatment session  She states she had anterior ankle pain with ascending stairs  Objective: See treatment diary below      Assessment: Reduction in pain with step up post MWM, however it did not fully resolve  Patient making gradual improvements in pain reduction  She demonstrates improved ROM, improved strength, and improved ankle motor control  High symptom irritability is patients greatest limitation at this time  She will benefit from continued PT for gradual progressive loading to enable her to improve tolerance to [facility] activity and RTW  Plan: Continue per plan of care  Precautions: HTN, DM      Manuals        Retrograde massage nv x5' x5' x5' x5' x5'       TC joint mobs nv Gr  III Gr  III Gr  III DF MWM x10 DF MWM 2x10 + TC distraction gr   V       AAROM left ankle nv x5' x5' x5'  x2'       Left ankle isometrics  x3' x3' x3' x5' x5'       Neuro Re-Ed             Ankle ABC's nv x2 x2 x2 x3 x3       BAP's board  nv nv   L3 x20 ea  NWB L3 x20 ea  NWB       Tandem walking     10 steps x 4 at support x4' at support       SLS     10"x5 at support 10"x5 at support                                              Ther Ex             Ankle AROM 4 way nv 2x10 ea 3x10 ea 3x10 ea HEP TB nv??       Calf stretch (KF, KE) nv 30"x4 ea 30"x4 ea 30"x4 ea 30"x4 ea 30"x4 ea       Towel bunches nv x2' x2' x2' x2' x2'       Seated hell slides  10" x10 10" x10 10" x10 10" x10 10" x10       Seated PF/DF   2x10 ea 2x10 ea 3x10 ea 3x10 ea                                              Ther Activity                                       Gait Training Modalities             CP L ankle post treatment PRN  x10' deferred deferred x10' Ice at home

## 2020-07-23 ENCOUNTER — OFFICE VISIT (OUTPATIENT)
Dept: PHYSICAL THERAPY | Facility: MEDICAL CENTER | Age: 55
End: 2020-07-23
Payer: COMMERCIAL

## 2020-07-23 DIAGNOSIS — M65.9 SYNOVITIS OF LEFT ANKLE: ICD-10-CM

## 2020-07-23 DIAGNOSIS — M25.572 CHRONIC PAIN OF LEFT ANKLE: Primary | ICD-10-CM

## 2020-07-23 DIAGNOSIS — G89.29 CHRONIC PAIN OF LEFT ANKLE: Primary | ICD-10-CM

## 2020-07-23 PROCEDURE — 97110 THERAPEUTIC EXERCISES: CPT | Performed by: PHYSICAL THERAPIST

## 2020-07-23 PROCEDURE — 97140 MANUAL THERAPY 1/> REGIONS: CPT | Performed by: PHYSICAL THERAPIST

## 2020-07-23 PROCEDURE — 97010 HOT OR COLD PACKS THERAPY: CPT | Performed by: PHYSICAL THERAPIST

## 2020-07-23 PROCEDURE — 97112 NEUROMUSCULAR REEDUCATION: CPT | Performed by: PHYSICAL THERAPIST
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
## 2020-07-28 ENCOUNTER — APPOINTMENT (OUTPATIENT)
Dept: PHYSICAL THERAPY | Facility: MEDICAL CENTER | Age: 55
End: 2020-07-28
Payer: COMMERCIAL
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
## 2020-07-30 ENCOUNTER — APPOINTMENT (OUTPATIENT)
Dept: PHYSICAL THERAPY | Facility: MEDICAL CENTER | Age: 55
End: 2020-07-30
Payer: COMMERCIAL

## 2020-08-04 ENCOUNTER — TELEPHONE (OUTPATIENT)
Dept: PODIATRY | Facility: CLINIC | Age: 55
End: 2020-08-04

## 2020-08-04 ENCOUNTER — EVALUATION (OUTPATIENT)
Dept: PHYSICAL THERAPY | Facility: MEDICAL CENTER | Age: 55
End: 2020-08-04
Payer: COMMERCIAL

## 2020-08-04 DIAGNOSIS — G89.29 CHRONIC PAIN OF LEFT ANKLE: Primary | ICD-10-CM

## 2020-08-04 DIAGNOSIS — M65.9 SYNOVITIS OF LEFT ANKLE: ICD-10-CM

## 2020-08-04 DIAGNOSIS — M25.572 CHRONIC PAIN OF LEFT ANKLE: Primary | ICD-10-CM

## 2020-08-04 PROCEDURE — 97112 NEUROMUSCULAR REEDUCATION: CPT | Performed by: PHYSICAL THERAPIST

## 2020-08-04 PROCEDURE — 97140 MANUAL THERAPY 1/> REGIONS: CPT | Performed by: PHYSICAL THERAPIST

## 2020-08-04 PROCEDURE — 97110 THERAPEUTIC EXERCISES: CPT | Performed by: PHYSICAL THERAPIST

## 2020-08-04 PROCEDURE — 97010 HOT OR COLD PACKS THERAPY: CPT | Performed by: PHYSICAL THERAPIST

## 2020-08-04 NOTE — PROGRESS NOTES
Progress Note     Today's date: 2020  Patient name: Everette Osei  : 1965  MRN: [id number]  Referring provider: [facility]  Dx:   Encounter Diagnosis     ICD-10-CM    1  Chronic pain of left ankle  M25 572     G89 29    2  Synovitis of left ankle  M65 9                   Subjective: Patient states she has "good days, bad days, and incredible days"  She states she has been having increased pain the last week  She reports 8 5/10 pain  She states she is concerned regarding the severe, constant pain she continues to have  Objective: See treatment diary below  Pain:   NPRS: 4-9/10  Quality: sharp, throbbing  Location: anterior medial left ankle    Active Range of Motion   Left Ankle/Foot   Dorsiflexion (ke): 6 degrees (improved from 0 degrees)  Plantar flexion: 28 degrees (improved from 24 degrees)  Inversion: 22 degrees (improved from 20 degrees)  Eversion: 12 degrees (improved from 10 degrees)    Joint Play   Left Ankle/Foot  Hypomobile in the talocrural joint, subtalar joint and midfoot  Strength/Myotome Testing     Left Ankle/Foot   Dorsiflexion: 4 (pain!)  Plantar flexion: 3+ (pain!)  Inversion: 3+  Eversion: 3+        Assessment: Patient has had fair compliance with PT attendance and HEP performance  Patient is making gradual progress towards her goals  She demonstrates improved ROM and improved strength, and also demonstrates improved neuromuscular control of the left ankle during balance interventions  However, patient has made little progress in improvements in pain  Treatment session modified today to hold on [address] [employer] activities with increased focus on stretching and AAROM  Patient educated in pain science today with limited understanding  She was instructed in downloading a free orientation spenser on her smart phone for pain management and will perform every 2 hours   Patient will benefit from continued PT for pain science education (including laterality training, mirror therapy, and graded activity), ROM, strength, and neuromuscular re-education  Patient may also benefit from consultation with pain management  Goals  1  Patient will be independent in individualized HEP  - ongoing  2  Patient will improve left ankle AROM 10-15 degrees in all planes  - ongoing  3  Patient will improve left ankle strength by 1 grade -ongoing  4  Patient will improve balance by 1 grade (from poor-fair to fair-good)  -ongoing  5  Patient will achieve left ankle strength WNL compared to contralateral side  - not met  6  Patient will be able to ambulate community distances  - not met  7  Patient  Will be confident in her ability to return to work by time of discharge  - not met  8  Patient will achieve score on FOTO by MDIC by time of discharge  - not met  9  Patient will be able to participate with her grand kids by time of discharge  - in progress    Plan: Continue per plan of care  PT 2x/week for 4-6 more weke s  POC end date 9/11/2020     Precautions: HTN, DM      Manuals 6/25 6/30 7/2 7/7 7/14 7/21 7/23 8/4     Retrograde massage/STM nv x5' x5' x5' x5' x5' x8' x10' + achilles tendon     TC joint mobs nv Gr  III Gr  III Gr  III DF MWM x10 DF MWM 2x10 + TC distraction gr   V Gr  II-III    MWM band x10     np     AAROM left ankle nv x5' x5' x5'  x2' x2' x5'     Left ankle isometrics  x3' x3' x3' x5' x5' x5'      Neuro Re-Ed             Ankle ABC's nv x2 x2 x2 x3 x3 x3 x3     BAP's board  nv nv   L3 x20 ea  NWB L3 x20 ea  NWB L3 x20ea NWB np     Tandem walking     10 steps x 4 at support x4' at support x4' at support np     SLS     10"x5 at support 10"x5 at support 10"x5 at support np     Laterality training        Jarek set up and instruction     Mirror training                          Ther Ex             Ankle AROM 4 way nv 2x10 ea 3x10 ea 3x10 ea HEP TB nv?? np      Calf stretch (KF, KE) nv 30"x4 ea 30"x4 ea 30"x4 ea 30"x4 ea 30"x4 ea 30"x4 ea 30"x4 ea      Towel bunches nv x2' x2' x2' x2' x2' x2'      Seated hell slides  10" x10 10" x10 10" x10 10" x10 10" x10 10" x10 10" x10     Seated PF/DF   2x10 ea 2x10 ea 3x10 ea 3x10 ea 3x10 ea 2x10 ea     Stand calf stretch on incline board        30"x4                               Ther Activity                                       Gait Training                                       Modalities             CP L ankle post treatment PRN  x10' deferred deferred x10' Ice at home x10' x10'

## 2020-08-04 NOTE — TELEPHONE ENCOUNTER
Mary Rehman called, she is going to physical therapy and the therapist told her to call because of the constant pain in her ankle where the hardware is  Naproxen isn't helping and Tylenol is not helping  She is trying to avoid another injection  Is there anything you can give her for her for the pain?

## 2020-08-05 NOTE — TELEPHONE ENCOUNTER
If she is convinced the hardware is the issue the only real resolution is to take it out  I'm not convinced its the hardware as she went over 6 months post-op with no ankle pain and full function  I'd be happy to see her to reassess her ankle following her PT  She should have an appointment with me  There's not much more pain medication we can try other than narcotics but I don't prescribe them for chronic pain

## 2020-08-13 ENCOUNTER — APPOINTMENT (OUTPATIENT)
Dept: PHYSICAL THERAPY | Facility: MEDICAL CENTER | Age: 55
End: 2020-08-13
Payer: COMMERCIAL

## 2020-08-18 ENCOUNTER — APPOINTMENT (OUTPATIENT)
Dept: PHYSICAL THERAPY | Facility: MEDICAL CENTER | Age: 55
End: 2020-08-18
Payer: COMMERCIAL

## 2020-08-20 ENCOUNTER — APPOINTMENT (OUTPATIENT)
Dept: PHYSICAL THERAPY | Facility: MEDICAL CENTER | Age: 55
End: 2020-08-20
Payer: COMMERCIAL

## 2020-08-25 ENCOUNTER — APPOINTMENT (OUTPATIENT)
Dept: PHYSICAL THERAPY | Facility: MEDICAL CENTER | Age: 55
End: 2020-08-25
Payer: COMMERCIAL

## 2020-08-27 ENCOUNTER — APPOINTMENT (OUTPATIENT)
Dept: PHYSICAL THERAPY | Facility: MEDICAL CENTER | Age: 55
End: 2020-08-27
Payer: COMMERCIAL

## 2020-08-28 ENCOUNTER — OFFICE VISIT (OUTPATIENT)
Dept: PHYSICAL THERAPY | Facility: MEDICAL CENTER | Age: 55
End: 2020-08-28
Payer: COMMERCIAL

## 2020-08-28 DIAGNOSIS — G89.29 CHRONIC PAIN OF LEFT ANKLE: Primary | ICD-10-CM

## 2020-08-28 DIAGNOSIS — M25.572 CHRONIC PAIN OF LEFT ANKLE: Primary | ICD-10-CM

## 2020-08-28 DIAGNOSIS — M65.9 SYNOVITIS OF LEFT ANKLE: ICD-10-CM

## 2020-08-28 PROCEDURE — 97110 THERAPEUTIC EXERCISES: CPT | Performed by: PHYSICAL THERAPIST

## 2020-08-28 PROCEDURE — 97140 MANUAL THERAPY 1/> REGIONS: CPT | Performed by: PHYSICAL THERAPIST
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
## 2020-08-31 ENCOUNTER — HOSPITAL ENCOUNTER (EMERGENCY)
Facility: HOSPITAL | Age: 55
Discharge: HOME | End: 2020-08-31
Attending: EMERGENCY MEDICINE | Admitting: EMERGENCY MEDICINE
Payer: COMMERCIAL

## 2020-08-31 ENCOUNTER — APPOINTMENT (EMERGENCY)
Dept: CT IMAGING | Facility: HOSPITAL | Age: 55
End: 2020-08-31
Payer: COMMERCIAL

## 2020-08-31 VITALS
DIASTOLIC BLOOD PRESSURE: 79 MMHG | TEMPERATURE: 98.5 F | HEART RATE: 80 BPM | OXYGEN SATURATION: 99 % | SYSTOLIC BLOOD PRESSURE: 166 MMHG | RESPIRATION RATE: 18 BRPM

## 2020-08-31 DIAGNOSIS — R13.10 ODYNOPHAGIA: Primary | ICD-10-CM

## 2020-08-31 LAB
ANION GAP SERPL CALCULATED.3IONS-SCNC: 7 MMOL/L (ref 4–13)
BASOPHILS # BLD AUTO: 0.06 THOUSANDS/ΜL (ref 0–0.1)
BASOPHILS NFR BLD AUTO: 1 % (ref 0–1)
BUN SERPL-MCNC: 9 MG/DL (ref 5–25)
CALCIUM SERPL-MCNC: 9.2 MG/DL (ref 8.3–10.1)
CHLORIDE SERPL-SCNC: 105 MMOL/L (ref 100–108)
CO2 SERPL-SCNC: 29 MMOL/L (ref 21–32)
CREAT SERPL-MCNC: 0.98 MG/DL (ref 0.6–1.3)
EOSINOPHIL # BLD AUTO: 0.1 THOUSAND/ΜL (ref 0–0.61)
EOSINOPHIL NFR BLD AUTO: 2 % (ref 0–6)
ERYTHROCYTE [DISTWIDTH] IN BLOOD BY AUTOMATED COUNT: 14 % (ref 11.6–15.1)
GFR SERPL CREATININE-BSD FRML MDRD: 75 ML/MIN/1.73SQ M
GLUCOSE SERPL-MCNC: 101 MG/DL (ref 65–140)
HCT VFR BLD AUTO: 43.2 % (ref 34.8–46.1)
HGB BLD-MCNC: 13 G/DL (ref 11.5–15.4)
IMM GRANULOCYTES # BLD AUTO: 0.02 THOUSAND/UL (ref 0–0.2)
IMM GRANULOCYTES NFR BLD AUTO: 0 % (ref 0–2)
LYMPHOCYTES # BLD AUTO: 2.89 THOUSANDS/ΜL (ref 0.6–4.47)
LYMPHOCYTES NFR BLD AUTO: 43 % (ref 14–44)
MCH RBC QN AUTO: 26.1 PG (ref 26.8–34.3)
MCHC RBC AUTO-ENTMCNC: 30.1 G/DL (ref 31.4–37.4)
MCV RBC AUTO: 87 FL (ref 82–98)
MONOCYTES # BLD AUTO: 0.62 THOUSAND/ΜL (ref 0.17–1.22)
MONOCYTES NFR BLD AUTO: 9 % (ref 4–12)
NEUTROPHILS # BLD AUTO: 3.05 THOUSANDS/ΜL (ref 1.85–7.62)
NEUTS SEG NFR BLD AUTO: 45 % (ref 43–75)
NRBC BLD AUTO-RTO: 0 /100 WBCS
PLATELET # BLD AUTO: 275 THOUSANDS/UL (ref 149–390)
PMV BLD AUTO: 11.5 FL (ref 8.9–12.7)
POTASSIUM SERPL-SCNC: 3.7 MMOL/L (ref 3.5–5.3)
RBC # BLD AUTO: 4.98 MILLION/UL (ref 3.81–5.12)
S PYO DNA THROAT QL NAA+PROBE: NORMAL
SODIUM SERPL-SCNC: 141 MMOL/L (ref 136–145)
TSH SERPL DL<=0.05 MIU/L-ACNC: 0.42 UIU/ML (ref 0.36–3.74)
WBC # BLD AUTO: 6.74 THOUSAND/UL (ref 4.31–10.16)

## 2020-08-31 PROCEDURE — 99284 EMERGENCY DEPT VISIT MOD MDM: CPT | Performed by: EMERGENCY MEDICINE

## 2020-08-31 PROCEDURE — 85025 COMPLETE CBC W/AUTO DIFF WBC: CPT | Performed by: EMERGENCY MEDICINE

## 2020-08-31 PROCEDURE — 84443 ASSAY THYROID STIM HORMONE: CPT | Performed by: EMERGENCY MEDICINE

## 2020-08-31 PROCEDURE — G1004 CDSM NDSC: HCPCS

## 2020-08-31 PROCEDURE — 87651 STREP A DNA AMP PROBE: CPT | Performed by: EMERGENCY MEDICINE

## 2020-08-31 PROCEDURE — 70491 CT SOFT TISSUE NECK W/DYE: CPT

## 2020-08-31 PROCEDURE — 96361 HYDRATE IV INFUSION ADD-ON: CPT

## 2020-08-31 PROCEDURE — 36415 COLL VENOUS BLD VENIPUNCTURE: CPT | Performed by: EMERGENCY MEDICINE

## 2020-08-31 PROCEDURE — 96360 HYDRATION IV INFUSION INIT: CPT

## 2020-08-31 PROCEDURE — 80048 BASIC METABOLIC PNL TOTAL CA: CPT | Performed by: EMERGENCY MEDICINE

## 2020-08-31 PROCEDURE — 99284 EMERGENCY DEPT VISIT MOD MDM: CPT

## 2020-08-31 RX ORDER — SUCRALFATE 1 G/1
1 TABLET ORAL
Qty: 56 TABLET | Refills: 0 | Status: SHIPPED | OUTPATIENT
Start: 2020-08-31 | End: 2020-09-14

## 2020-08-31 RX ORDER — TRAMADOL HYDROCHLORIDE 50 MG/1
50 TABLET ORAL ONCE
Status: COMPLETED | OUTPATIENT
Start: 2020-08-31 | End: 2020-08-31

## 2020-08-31 RX ADMIN — TRAMADOL HYDROCHLORIDE 50 MG: 50 TABLET, FILM COATED ORAL at 18:05

## 2020-08-31 RX ADMIN — IOHEXOL 85 ML: 350 INJECTION, SOLUTION INTRAVENOUS at 17:43

## 2020-08-31 RX ADMIN — SODIUM CHLORIDE 1000 ML: 0.9 INJECTION, SOLUTION INTRAVENOUS at 16:36

## 2020-08-31 NOTE — DISCHARGE INSTRUCTIONS
Sore Throat, Ambulatory Care   GENERAL INFORMATION:   A sore throat  is often caused by a cold or flu virus  A sore throat may also be caused by bacteria such as strep  Other causes include smoking, a runny nose, allergies, or acid reflux  Seek immediate care for the following symptoms:   · Trouble breathing or swallowing because your throat is swollen or sore    · Drooling because it hurts too much to swallow    · A painful lump in your throat that does not go away after 5 days    · A fever higher than 102? F (39?C) or lasts longer than 3 days    · Confusion    · Blood in your throat or ear  Treatment for a sore throat  will depend on the cause how severe it is  A sore throat cause by a virus will go away on its own without treatment  You will need antibiotics if your sore throat is caused by bacteria  Your sore throat should start to feel better within 3 to 5 days for both viral and bacterial infections  Care for your sore throat:   · Gargle with salt water  Mix ¼ teaspoon salt in a glass of warm water and gargle  This may help reduce swelling in your throat  · Take ibuprofen or acetaminophen:  These medicines decrease pain and fever  They are available without a doctor's order  Ask your healthcare provider which medicine is best for you  Ask how much to take and how often to take it  · Drink more liquids  Cold or warm drinks may help soothe your sore throat  Drinking liquids can also help prevent dehydration  · Use a cool-steam humidifier  to help moisten the air in your room and reduce your throat pain  · Use lozenges, ice, soft foods, or popsicles  to soothe your throat  · Rest your throat as much as possible  Try not to use your voice  This may irritate your throat and worsen your symptoms  Follow up with your healthcare provider as directed:  Write down your questions so you remember to ask them during your visits  CARE AGREEMENT:   You have the right to help plan your care   Learn about your health condition and how it may be treated  Discuss treatment options with your caregivers to decide what care you want to receive  You always have the right to refuse treatment  The above information is an  only  It is not intended as medical advice for individual conditions or treatments  Talk to your doctor, nurse or pharmacist before following any medical regimen to see if it is safe and effective for you  © 2014 [address] is for End User's use only and may not be sold, redistributed or otherwise used for commercial purposes  All illustrations and images included in CareNotes® are the copyrighted property of [employer]  or [employer] Saavedra

## 2020-08-31 NOTE — ED PROVIDER NOTES
History  Chief Complaint   Patient presents with    Medical Problem     Pt  reports pain with swallowing and swelling to her left shoulder and reports pain with movement  Pt  reports she feel like her neck is swelling  59-year-old female with a history of hypertension, diabetes presents to the emergency department with a 2 day history of painful swallowing along with a painful mass to the inferior aspect of her left neck  Patient denies trauma  No fevers or chills  No chest pain, shortness or breath period no abdomen pain, nausea, vomitting or diarrhea   Renan weight lost       History provided by:  Patient   used: No    Medical Problem   Location:  Painful swallowing/ left sided neck Mass  Onset quality:  Gradual  Duration:  3 days  Timing:  Constant  Progression:  Unchanged  Chronicity:  New  Relieved by:  Nothing tried  Worsened by:  Swallowing  Ineffective treatments:  None tried  Associated symptoms: sore throat    Associated symptoms: no abdominal pain, no chest pain, no congestion, no cough, no diarrhea, no ear pain, no fatigue, no fever, no headaches, no loss of consciousness, no myalgias, no nausea, no rash, no rhinorrhea, no shortness of breath, no vomiting and no wheezing        Prior to Admission Medications   Prescriptions Last Dose Informant Patient Reported? Taking?    BLACK COHOSH HOT FLASH RELIEF PO   Yes No   Sig: Take 1 tablet by mouth daily   Cholecalciferol (VITAMIN D) 2000 units tablet   Yes No   Sig: Take 2,000 Units by mouth daily   DULoxetine (CYMBALTA) 30 mg delayed release capsule   Yes No   Sig: Take 30 mg by mouth daily at bedtime   Famotidine-Ca Carb-Mag Hydrox (PEPCID COMPLETE PO)   Yes No   Sig: Take by mouth as needed   Multiple Vitamin (MULTIVITAMIN) tablet   Yes No   Sig: Take 1 tablet by mouth daily   ibuprofen (MOTRIN) 800 mg tablet   Yes No   Sig: Take 800 mg by mouth as needed    lisinopril (ZESTRIL) 20 mg tablet   Yes No   Sig: Take 20 mg by mouth every morning    metFORMIN (GLUCOPHAGE) 500 mg tablet   Yes No   Sig: Take 500 mg by mouth daily with breakfast   methylPREDNISolone 4 MG tablet therapy pack   No No   Sig: Use as directed on package   naproxen (EC NAPROSYN) 500 MG EC tablet   No No   Sig: Take 1 tablet (500 mg total) by mouth 2 (two) times a day with meals   traMADol (ULTRAM) 50 mg tablet   No No   Sig: Take 1 tablet (50 mg total) by mouth every 6 (six) hours as needed for moderate pain   vitamin E 600 UNIT capsule   Yes No   Sig: Take 600 Units by mouth daily      Facility-Administered Medications: None       Past Medical History:   Diagnosis Date    Anxiety     Back pain     Diabetes mellitus ([facility] [address] )     Pre-diabetes    Fracture     Left ankle   GERD (gastroesophageal reflux disease)     Hypertension     Impaired ambulation     Left ankle fracture/ Has walking boot, crutches or walker   PONV (postoperative nausea and vomiting)     Sleep apnea     Says  said she didn't need CPAP    Snores     Wears glasses        Past Surgical History:   Procedure Laterality Date    APPENDECTOMY      COLONOSCOPY      HYSTERECTOMY      Partial  Has ovaries    [place] OPEN TX DISTAL TIBIOFIBULAR JOINT DISRUPTION Left 8/16/2019    Procedure: LEFT ANKLE SYNDESMOTIC LIGAMENT & ATFL REPAIR;  Surgeon: Mónica Encarnacion DPM;  Location: [facility] OR;  Service: Podiatry       History reviewed  No pertinent family history  I have reviewed and agree with the history as documented      E-Cigarette/Vaping    E-Cigarette Use Never User      E-Cigarette/Vaping Substances    Nicotine No     THC No     CBD No     Flavoring No     Other No     Unknown No      Social History     Tobacco Use    Smoking status: Former Smoker     Packs/day: 0 25     Years: 10 00     Pack years: 2 50     Types: Cigarettes    Smokeless tobacco: Never Used    Tobacco comment: Quit 25 years ago   Substance Use Topics    Alcohol use: Yes     Comment: social    Drug use: Never Review of Systems   Constitutional: Negative  Negative for chills, diaphoresis, fatigue and fever  HENT: Positive for sore throat and trouble swallowing  Negative for congestion, drooling, ear pain, facial swelling, rhinorrhea and voice change  Eyes: Negative  Negative for discharge, redness and itching  Respiratory: Negative  Negative for apnea, cough, chest tightness, shortness of breath and wheezing  Cardiovascular: Negative for chest pain, palpitations and leg swelling  Gastrointestinal: Negative  Negative for abdominal pain, diarrhea, nausea and vomiting  Endocrine: Negative  Genitourinary: Negative  Negative for flank pain, frequency and urgency  Musculoskeletal: Negative  Negative for back pain and myalgias  Skin: Negative  Negative for rash  Allergic/Immunologic: Negative  Neurological: Negative  Negative for dizziness, loss of consciousness, syncope, weakness, light-headedness, numbness and headaches  Hematological: Negative  All other systems reviewed and are negative  Physical Exam  Physical Exam  Vitals signs and nursing note reviewed  Constitutional:       General: She is not in acute distress  Appearance: Normal appearance  She is well-developed and well-groomed  She is not ill-appearing, toxic-appearing or diaphoretic  HENT:      Head: Normocephalic and atraumatic  Mass present  Jaw: No trismus, swelling or pain on movement  Right Ear: Tympanic membrane and external ear normal       Left Ear: Tympanic membrane and external ear normal       Nose: Nose normal       Mouth/Throat:      Mouth: Mucous membranes are moist       Pharynx: No oropharyngeal exudate or posterior oropharyngeal erythema  Eyes:      General: No scleral icterus  Right eye: No discharge  Left eye: No discharge  Conjunctiva/sclera: Conjunctivae normal       Pupils: Pupils are equal, round, and reactive to light     Neck:      Musculoskeletal: Normal range of motion and neck supple  Muscular tenderness present  No neck rigidity  Thyroid: No thyromegaly  Vascular: No carotid bruit or JVD  Trachea: No tracheal deviation  Cardiovascular:      Rate and Rhythm: Normal rate and regular rhythm  Heart sounds: Normal heart sounds  No murmur  No friction rub  No gallop  Pulmonary:      Effort: Pulmonary effort is normal  No respiratory distress  Breath sounds: Normal breath sounds  No stridor  No wheezing or rales  Chest:      Chest wall: No tenderness  Abdominal:      General: Bowel sounds are normal  There is no distension  Palpations: Abdomen is soft  There is no mass  Tenderness: There is no abdominal tenderness  Hernia: No hernia is present  Musculoskeletal: Normal range of motion  General: No tenderness or deformity  Lymphadenopathy:      Cervical: No cervical adenopathy  Skin:     General: Skin is warm and dry  Coloration: Skin is not jaundiced or pale  Findings: No bruising, erythema, lesion or rash  Neurological:      General: No focal deficit present  Mental Status: She is alert and oriented to person, place, and time  Mental status is at baseline  Motor: No weakness or abnormal muscle tone  Deep Tendon Reflexes: Reflexes are normal and symmetric  Psychiatric:         Mood and Affect: Mood normal          Behavior: Behavior is cooperative           Vital Signs  ED Triage Vitals [08/31/20 1534]   Temperature Pulse Respirations Blood Pressure SpO2   98 5 °F (36 9 °C) 81 18 113/56 100 %      Temp Source Heart Rate Source Patient Position - Orthostatic VS BP Location FiO2 (%)   Temporal Monitor Sitting Right arm --      Pain Score       --           Vitals:    08/31/20 1534   BP: 113/56   Pulse: 81   Patient Position - Orthostatic VS: Sitting         Visual Acuity      ED Medications  Medications   sodium chloride 0 9 % bolus 1,000 mL (has no administration in time range)       Diagnostic Studies  Results Reviewed     Procedure Component Value Units Date/Time    CBC and differential [[id number]]     Lab Status:  No result Specimen:  Blood     Basic metabolic panel [[id number]]     Lab Status:  No result Specimen:  Blood     TSH [[id number]]     Lab Status:  No result Specimen:  Blood     Strep A PCR [[id number]]     Lab Status:  No result Specimen:  Throat                  CT soft tissue neck with contrast    (Results Pending)              Procedures  Procedures         ED Course       US AUDIT      Most Recent Value   Initial Alcohol Screen: US AUDIT-C    1  How often do you have a drink containing alcohol?  0 Filed at: 08/31/2020 1534   2  How many drinks containing alcohol do you have on a typical day you are drinking? 0 Filed at: 08/31/2020 1535   3b  FEMALE Any Age, or MALE 65+: How often do you have 4 or more drinks on one occassion? 0 Filed at: 08/31/2020 1535   Audit-C Score  0 Filed at: 08/31/2020 1535                  TALIA/DAST-10      Most Recent Value   How many times in the past year have you    Used an illegal drug or used a prescription medication for non-medical reasons? Never Filed at: 08/31/2020 1535                                MDM  Number of Diagnoses or Management Options  Diagnosis management comments: 55 y/o female presents with a 3 day hx of painful and difficulty swallowing  Also has noticed a painful lump left lower neck  No fever/weight loss/night sweats/ cp/sob/n/v/d  No voice change  Pt has EGD January 2020 showing esophagitis  Pt stopped taking meds because they did not help  Pt does have soft tender mass inferior left neck  Will check basic labs, strep, tsh, ct neck to r/o mass         Amount and/or Complexity of Data Reviewed  Clinical lab tests: ordered and reviewed  Tests in the radiology section of CPT®: ordered and reviewed  Review and summarize past medical records: yes          Disposition  Final diagnoses:   None     ED Disposition None      Follow-up Information    None         Patient's Medications   Discharge Prescriptions    No medications on file     No discharge procedures on file      PDMP Review     None          ED Provider  Electronically Signed by           Guido Krueger DO  09/06/20 8698

## 2020-08-31 NOTE — ED NOTES
Patient returned from CT at this time, going to the bathroom now        Chiquita Mancini RN  08/31/20 1974

## 2020-09-10 ENCOUNTER — TELEPHONE (OUTPATIENT)
Dept: OBGYN CLINIC | Facility: OTHER | Age: 55
End: 2020-09-10

## 2020-09-10 NOTE — TELEPHONE ENCOUNTER
Called and spoke with patient   Addressed concerns she had regarding her last appointment scheduled with Dr Lizbeth Madsen

## 2020-09-15 ENCOUNTER — APPOINTMENT (OUTPATIENT)
Dept: PHYSICAL THERAPY | Facility: MEDICAL CENTER | Age: 55
End: 2020-09-15
Payer: COMMERCIAL

## 2020-09-16 ENCOUNTER — OFFICE VISIT (OUTPATIENT)
Dept: PODIATRY | Facility: CLINIC | Age: 55
End: 2020-09-16
Payer: COMMERCIAL

## 2020-09-16 VITALS — WEIGHT: 170 LBS | BODY MASS INDEX: 32.1 KG/M2 | HEIGHT: 61 IN

## 2020-09-16 DIAGNOSIS — M76.822 TIBIAL TENDONITIS, POSTERIOR, LEFT: Primary | ICD-10-CM

## 2020-09-16 DIAGNOSIS — M65.9 SYNOVITIS OF LEFT ANKLE: ICD-10-CM

## 2020-09-16 DIAGNOSIS — M25.572 CHRONIC PAIN OF LEFT ANKLE: ICD-10-CM

## 2020-09-16 DIAGNOSIS — G89.29 CHRONIC PAIN OF LEFT ANKLE: ICD-10-CM

## 2020-09-16 PROCEDURE — 99214 OFFICE O/P EST MOD 30 MIN: CPT | Performed by: PODIATRIST

## 2020-09-16 RX ORDER — DEXAMETHASONE SODIUM PHOSPHATE 4 MG/ML
1 INJECTION, SOLUTION INTRA-ARTICULAR; INTRALESIONAL; INTRAMUSCULAR; INTRAVENOUS; SOFT TISSUE
Qty: 1 ML | Refills: 6 | Status: SHIPPED | OUTPATIENT
Start: 2020-09-16

## 2020-09-16 NOTE — LETTER
September 16, 2020     Patient: Geoff Cornea   YOB: 1965   Date of Visit: 9/16/2020       To Whom it May Concern:    Geoff Cornea is under my professional care  She was seen in my office on 9/16/2020  She is having severe chronic pain in her left ankle  At the moment I recommended light duty where she is off her foot  No lifting over 10 pounds  NO stnadning longer than 30 minutes  NO climbing, squatting or heavy activity  I am recommending she be out of work for the next 4 weeks until she can get some treatment and get reevaluated  If you have any questions or concerns, please don't hesitate to call           Sincerely,          Sonia Caputo DPM        CC: Geoff Cornea

## 2020-09-16 NOTE — PROGRESS NOTES
Assessment/Plan:       Diagnoses and all orders for this visit:    Tibial tendonitis, posterior, left  -     dexamethasone (DECADRON) 4 mg/mL; 0 25 mL (1 mg total) by Iontophoresis route every 24 hours    Chronic pain of left ankle  -     dexamethasone (DECADRON) 4 mg/mL; 0 25 mL (1 mg total) by Iontophoresis route every 24 hours    Synovitis of left ankle  -     dexamethasone (DECADRON) 4 mg/mL; 0 25 mL (1 mg total) by Iontophoresis route every 24 hours      Patient has complicated presentation but does not seem to be seen any improvement with physical therapy  I spent 45 minutes with her today in the office reviewing her condition  I reviewed the Theone Prasanna recent for physical therapy notes  Some days patient has no pain and on work days the patient's pain is severe  It truly sounds as if the patient is highly physically labor intensive job is not sustain able given that this clearly exacerbates her ankle pain  When she does not work the pain minimizes and almost resolved  In addition following her ankle repair last summer she went 6 full months on full activity before returning to work  During that time she had very little complaints of pain or stiffness or any handicap in that ankle  The pain return once she returned to full duty at work  Most likely this is posttraumatic arthritis and synovitis  I have offered injections and diagnostic   Ankle arthroscopy but she has declined these options  She does appear to have some posterior tibial tendonitis today  Although her pain in description is somewhat vague  On her recent MRI there is no deltoid ligament insufficiency to account for her pain  I discussed a MAFO brace 1st arch support but she would like to try the arch supports  I also recommended iontophoresis with steroid at physical therapy and I provided a prescription  I highly suggested the patient take some time off work is clearly this is exacerbating factor  I provided a no    I also recommended she seek a position at her job that will require less physical labor on her feet  I will check her progress in 6 weeks  Subjective:      Patient ID: Nilo Andrade is a 54 y o  female  In the summer of 2019 patient broke her left ankle  In August 2019 I performed open reduction internal fixation of her syndesmosis and lateral ligament repair  She recovered very well from that procedure  And son notable improvement in physical therapy  When I saw the patient in the end of November 2019 she had no pain and was able to perform daily range of motion without any handicap  In February the patient arrived back in my office after having 1 week of acute medial ankle pain  At the time I performed an injection which did provide some ankle pain relief and centered physical therapy  She went to therapy and did not see much improvement so I ordered an MRI  The MRI did not show any significant pathology  The ligament repair seem to be stable and anatomic  Alignment of the ankle was noted on the report  There did not appear to be any evidence of OCD lesion of her talus or ligament insufficiency  At that time she was encouraged to continue therapy  She was making little progress so I again offered a steroid injection and/or and ankle arthroscopy  She declined both those at that time and continued therapy  She would not sought a 2nd opinion 1 or 2 weeks ago by another foot and ankle surgeon who after reviewing her charts also recommended a possible arthroscopy although on exam there was not necessarily anything specific they could diagnosis  Patient is still having significant medial ankle pain which is exacerbated by her job  Her ankle feels much better on the time she is off work  By the time she gets done with her 10-12 hour shift is extremely painful  Patient is very frustrated        The following portions of the patient's history were reviewed and updated as appropriate: allergies, current medications, past family history, past medical history, past social history, past surgical history and problem list     Review of Systems   Constitutional: Negative  HENT: Negative for sinus pressure and sinus pain  Respiratory: Negative for cough and shortness of breath  Cardiovascular: Negative for leg swelling  Gastrointestinal: Negative for diarrhea and nausea  Musculoskeletal: Positive for arthralgias, gait problem, joint swelling and myalgias  Skin: Negative for color change and wound  Neurological: Positive for weakness  Negative for numbness  Objective:      Ht 5' 1" (1 549 m)   Wt 77 1 kg (170 lb)   BMI 32 12 kg/m²          Physical Exam    Vitals reviewed    Constitutional: Patient is not distressed  Patient is well developed  Patient is not obese  Vascular: Dorsalis pedis and posterior tibial pulses palpable  Capillary refill time within normal limits to all digits  No erythema  No edema  No significant varicosities  Dermatology: No rash  No open lesions  Present pedal hair  Skin has healthy turgor  Musculoskeletal:    A right foot exam was performed  Skin: normal  Swelling: none and minimal  Warmth: no warmth  Tenderness:  Tenderness at the posterior tibial tendon of the medial malleolus  Negative Tinel sign  ROM: normal and limited by pain  Strength: normal, 5 on 5 tibialis anterior strength, 5 of 5 EHL strength, 5 of 5 gastroc-soleus strength, 5 of 5 EDL strength, 5 of 5 peroneus brevis, 3 of 5 tibialis posterior, 4 of 5 FHL and 4 of 5 FDL  Gait: antalgic  Stability: stable to testing, anterior drawer: negative and Lachman: negative  Crepitus: no  Neurological Exam: normal  Vascular Exam: normal  Lymphatic Exam: normal        Neurological: Monofilament sensation is intact  Vibratory sensation is intact     Achilles reflex is normal    Proprioception is normal    Respiratory: Normal respiratory effort, no distress    Psych: Patient is AAOx3  Normal mood       Lymphatic: nonpalpable popliteal lymph nodes  Nonpalpable groin lymph nodes

## 2020-09-17 ENCOUNTER — APPOINTMENT (OUTPATIENT)
Dept: PHYSICAL THERAPY | Facility: MEDICAL CENTER | Age: 55
End: 2020-09-17
Payer: COMMERCIAL

## 2020-09-21 ENCOUNTER — EVALUATION (OUTPATIENT)
Dept: PHYSICAL THERAPY | Facility: MEDICAL CENTER | Age: 55
End: 2020-09-21
Payer: COMMERCIAL

## 2020-09-21 DIAGNOSIS — M76.822 TIBIAL TENDONITIS, POSTERIOR, LEFT: Primary | ICD-10-CM

## 2020-09-21 PROCEDURE — 97161 PT EVAL LOW COMPLEX 20 MIN: CPT | Performed by: PHYSICAL THERAPIST

## 2020-09-21 NOTE — LETTER
2020    Sonia Caputo, [facility] [address]    Patient: Geoff Yeboah   YOB: 1965   Date of Visit: 2020     Encounter Diagnosis     ICD-10-CM    1  Tibial tendonitis, posterior, left  [id number] Ambulatory referral to Physical Therapy       Dear Dr Pritesh Dillard: Thank you for your recent referral of Geoff Yeboah  Please review the attached evaluation summary from Ligia's recent visit  Please verify that you agree with the plan of care by signing the attached order  If you have any questions or concerns, please do not hesitate to call  I sincerely appreciate the opportunity to share in the care of one of your patients and hope to have another opportunity to work with you in the near future  Sincerely,    Derrek Stoner, PT      Referring Provider:      I certify that I have read the below Plan of Care and certify the need for these services furnished under this plan of treatment while under my care  Sonia Caputo, [facility] [address]  [address] [place] [address] Avenue: [address] [place] [address]          PT Evaluation     Today's date: 2020  Patient name: Geoff Yeboah  : 1965  MRN: [id number]  Referring provider: [facility] Lee  Dx:   Encounter Diagnosis   Name Primary?  Tibial tendonitis, posterior, left                   Assessment  Assessment details: Geoff Yeboah is a 53 y/o female who presents with complaints of chronic left ankle pain  The patient's greatest concerns are not being able to walk and continue working with pain and swelling in her ankle  Primary movement impairment diagnosis of talocrural hypomobility and posterior tibialis dysfunction, resulting in pathoanatomical symptoms of posterior tibial tendonitis and synovitis, which limits her ability to perform functional activities without pain    Pt  will benefit from skilled PT services that includes manual therapy techniques to enhance tissue extensibility, neuromuscular re-education to facilitate motor control, therapeutic exercise to increase functional mobility, and modalities prn to reduce pain and inflammation  Impairments: abnormal gait, abnormal muscle firing, abnormal or restricted ROM, abnormal movement, activity intolerance, impaired balance, impaired physical strength, lacks appropriate home exercise program, pain with function and poor body mechanics  Barriers to therapy: Chronicity of condition and previous sx  Understanding of Dx/Px/POC: good   Prognosis: fair    Goals  Impairment Goals  - Pt I with initial HEP in 1-2 visits  - Improve ROM equal to contralateral side in 4-6 weeks  - Increase strength to 5/5 in all affected areas in 4-6 weeks    Functional Goals  - Increase Functional Status Measure to: 50 in 6-8 weeks  - Patient will be independent with comprehensive HEP in 6-8 weeks  - Ambulation is improved to prior level of function in 6-8 weeks  - Stair climbing is improved to prior level of function in 6-8 weeks  - Squatting is improved to prior level of function in 6-8 weeks    Plan  Patient would benefit from: PT eval  Planned modality interventions: thermotherapy: hydrocollator packs, cryotherapy and iontophoresis  Planned therapy interventions: joint mobilization, manual therapy, neuromuscular re-education, patient education, strengthening, stretching, balance, balance/weight bearing training, flexibility, functional ROM exercises, therapeutic exercise, Coffey taping, compression and home exercise program  Frequency: 2x week  Treatment plan discussed with: patient      Subjective Evaluation    History of Present Illness  Mechanism of injury: Patient reports that she broke her left ankle last June and underwent a left ankle syndesmotic ligament & ATFL repair on August 16th, 2019  Patient had physical therapy after the ankle surgery and continues to have pain    She states that her ankle is painful c/ weight bearing and after walking for several minutes  Patient would like to return to work and decrease pain in order to increase her activity level  Pain  Current pain ratin  At best pain ratin  At worst pain rating: 10  Quality: throbbing (tingling)  Relieving factors: medications, ice and heat (Naproxen, Alleve, elevation)  Aggravating factors: walking  Progression: no change      Diagnostic Tests  Abnormal x-ray: Status post ORIF for distal fibular fracture including syndesmotic fixation with orthopedic hardware causing susceptibility artifacts as well as nonhomogeneous fat saturation; Status post repair of the ATFL appears intact; No evidence of osteochondral les  Treatments  Previous treatment: physical therapy and medication  Current treatment: physical therapy  Patient Goals  Patient goals for therapy: independence with ADLs/IADLs, increased strength, return to sport/leisure activities, return to work, improved balance, decreased pain and increased motion        Objective     Observations   Left Ankle/Foot   Positive for edema  Negative for deformity, drainage, incision and trophic changes  Palpation   Left   Hypertonic in the posterior tibialis  Tenderness of the lateral gastrocnemius, medial gastrocnemius and posterior tibialis  Tenderness   Left Ankle/Foot   Tenderness in the anterior ankle, medial malleolus and posterior tibial tendon  No tenderness in the anterior talofibular ligament, fifth metatarsal base and navicular  Neurological Testing     Sensation     Ankle/Foot   Left Ankle/Foot   Intact: light touch    Right Ankle/Foot   Intact: light touch     Additional Neurological Details  Reflexes NT      Active Range of Motion   Left Ankle/Foot   Dorsiflexion (ke): 6 degrees   Plantar flexion: 40 degrees   Inversion: 18 degrees   Eversion: 10 degrees     Right Ankle/Foot   Dorsiflexion (ke): 8 degrees   Plantar flexion: 60 degrees   Inversion: 25 degrees   Eversion: 15 degrees Passive Range of Motion   Left Ankle/Foot    Dorsiflexion (ke): 8 degrees with pain  Plantar flexion: 45 degrees with pain  Inversion: 20 degrees   Eversion: 12 degrees with pain    Joint Play   Left Ankle/Foot  Hypomobile in the talocrural joint  Strength/Myotome Testing     Left Ankle/Foot   Dorsiflexion: 3-  Plantar flexion: 3-  Inversion: 3-  Eversion: 3-  Great toe extension: 5    Right Ankle/Foot   Dorsiflexion: 5  Plantar flexion: 5  Inversion: 5  Eversion: 5  Great toe flexion: 5  Great toe extension: 5    Additional Strength Details  Hip ROM and strength testing deferred at this time  Tests   Left Ankle/Foot   Negative for anterior drawer, Tinel's sign (tarsal tunnel), valgus tilt and varus tilt  Functional Assessment      Squat    Unable to perform   Single Leg Squat   Left Leg  Unable to perform  Single Leg Stance   Left single leg stance time: unable to perform  Right single leg stance time: 10s         Precautions:  HTN; DM; GERD    Manuals             STM/MFR                                                    Neuro Re-Ed                                                                                                        Ther Ex             Ankle ABCs             Ankle 4 way                                                                                           Ther Activity                                       Gait Training                                       Modalities             Ionto             Compression                    Derrek Stoner, PT  9/21/2020,10:20 AM

## 2020-09-21 NOTE — PROGRESS NOTES
PT Evaluation     Today's date: 2020  Patient name: Shanna Molina  : 1965  MRN: [id number]  Referring provider: [facility] Negron  Dx:   Encounter Diagnosis   Name Primary?  Tibial tendonitis, posterior, left                   Assessment  Assessment details: Shanna Molina is a 53 y/o female who presents with complaints of chronic left ankle pain  The patient's greatest concerns are not being able to walk and continue working with pain and swelling in her ankle  Primary movement impairment diagnosis of talocrural hypomobility and posterior tibialis dysfunction, resulting in pathoanatomical symptoms of posterior tibial tendonitis and synovitis, which limits her ability to perform functional activities without pain  Pt  will benefit from skilled PT services that includes manual therapy techniques to enhance tissue extensibility, neuromuscular re-education to facilitate motor control, therapeutic exercise to increase functional mobility, and modalities prn to reduce pain and inflammation    Impairments: abnormal gait, abnormal muscle firing, abnormal or restricted ROM, abnormal movement, activity intolerance, impaired balance, impaired physical strength, lacks appropriate home exercise program, pain with function and poor body mechanics  Barriers to therapy: Chronicity of condition and previous sx  Understanding of Dx/Px/POC: good   Prognosis: fair    Goals  Impairment Goals  - Pt I with initial HEP in 1-2 visits  - Improve ROM equal to contralateral side in 4-6 weeks  - Increase strength to 5/5 in all affected areas in 4-6 weeks    Functional Goals  - Increase Functional Status Measure to: 50 in 6-8 weeks  - Patient will be independent with comprehensive HEP in 6-8 weeks  - Ambulation is improved to prior level of function in 6-8 weeks  - Stair climbing is improved to prior level of function in 6-8 weeks  - Squatting is improved to prior level of function in 6-8 weeks    Plan  Patient would benefit from: PT eval  Planned modality interventions: thermotherapy: hydrocollator packs, cryotherapy and iontophoresis  Planned therapy interventions: joint mobilization, manual therapy, neuromuscular re-education, patient education, strengthening, stretching, balance, balance/weight bearing training, flexibility, functional ROM exercises, therapeutic exercise, Coffey taping, compression and home exercise program  Frequency: 2x week  Treatment plan discussed with: patient      Subjective Evaluation    History of Present Illness  Mechanism of injury: Patient reports that she broke her left ankle last  and underwent a left ankle syndesmotic ligament & ATFL repair on 2019  Patient had physical therapy after the ankle surgery and continues to have pain  She states that her ankle is painful c/ weight bearing and after walking for several minutes  Patient would like to return to work and decrease pain in order to increase her activity level  Pain  Current pain ratin  At best pain ratin  At worst pain rating: 10  Quality: throbbing (tingling)  Relieving factors: medications, ice and heat (Naproxen, Alleve, elevation)  Aggravating factors: walking  Progression: no change      Diagnostic Tests  Abnormal x-ray: Status post ORIF for distal fibular fracture including syndesmotic fixation with orthopedic hardware causing susceptibility artifacts as well as nonhomogeneous fat saturation; Status post repair of the ATFL appears intact; No evidence of osteochondral les  Treatments  Previous treatment: physical therapy and medication  Current treatment: physical therapy  Patient Goals  Patient goals for therapy: independence with ADLs/IADLs, increased strength, return to sport/leisure activities, return to work, improved balance, decreased pain and increased motion        Objective     Observations   Left Ankle/Foot   Positive for edema   Negative for deformity, drainage, incision and trophic changes  Palpation   Left   Hypertonic in the posterior tibialis  Tenderness of the lateral gastrocnemius, medial gastrocnemius and posterior tibialis  Tenderness   Left Ankle/Foot   Tenderness in the anterior ankle, medial malleolus and posterior tibial tendon  No tenderness in the anterior talofibular ligament, fifth metatarsal base and navicular  Neurological Testing     Sensation     Ankle/Foot   Left Ankle/Foot   Intact: light touch    Right Ankle/Foot   Intact: light touch     Additional Neurological Details  Reflexes NT  Active Range of Motion   Left Ankle/Foot   Dorsiflexion (ke): 6 degrees   Plantar flexion: 40 degrees   Inversion: 18 degrees   Eversion: 10 degrees     Right Ankle/Foot   Dorsiflexion (ke): 8 degrees   Plantar flexion: 60 degrees   Inversion: 25 degrees   Eversion: 15 degrees     Passive Range of Motion   Left Ankle/Foot    Dorsiflexion (ke): 8 degrees with pain  Plantar flexion: 45 degrees with pain  Inversion: 20 degrees   Eversion: 12 degrees with pain    Joint Play   Left Ankle/Foot  Hypomobile in the talocrural joint  Strength/Myotome Testing     Left Ankle/Foot   Dorsiflexion: 3-  Plantar flexion: 3-  Inversion: 3-  Eversion: 3-  Great toe extension: 5    Right Ankle/Foot   Dorsiflexion: 5  Plantar flexion: 5  Inversion: 5  Eversion: 5  Great toe flexion: 5  Great toe extension: 5    Additional Strength Details  Hip ROM and strength testing deferred at this time  Tests   Left Ankle/Foot   Negative for anterior drawer, Tinel's sign (tarsal tunnel), valgus tilt and varus tilt  Functional Assessment      Squat    Unable to perform   Single Leg Squat   Left Leg  Unable to perform  Single Leg Stance   Left single leg stance time: unable to perform  Right single leg stance time: 10s         Precautions:  HTN; DM; GERD    Manuals             STM/MFR                                                    Neuro Re-Ed Ther Ex             Ankle ABCs             Ankle 4 way                                                                                           Ther Activity                                       Gait Training                                       Modalities             Ionto             Compression                    Kunal Byers, PT  9/21/2020,10:20 AM

## 2020-09-22 ENCOUNTER — APPOINTMENT (OUTPATIENT)
Dept: PHYSICAL THERAPY | Facility: MEDICAL CENTER | Age: 55
End: 2020-09-22
Payer: COMMERCIAL

## 2020-09-22 ENCOUNTER — TRANSCRIBE ORDERS (OUTPATIENT)
Dept: PHYSICAL THERAPY | Facility: MEDICAL CENTER | Age: 55
End: 2020-09-22

## 2020-09-22 DIAGNOSIS — M76.822 TIBIAL TENDONITIS, POSTERIOR, LEFT: Primary | ICD-10-CM

## 2020-09-23 ENCOUNTER — OFFICE VISIT (OUTPATIENT)
Dept: PHYSICAL THERAPY | Facility: MEDICAL CENTER | Age: 55
End: 2020-09-23
Payer: COMMERCIAL

## 2020-09-23 DIAGNOSIS — M76.822 TIBIAL TENDONITIS, POSTERIOR, LEFT: Primary | ICD-10-CM

## 2020-09-23 DIAGNOSIS — G89.29 CHRONIC PAIN OF LEFT ANKLE: ICD-10-CM

## 2020-09-23 DIAGNOSIS — M25.572 CHRONIC PAIN OF LEFT ANKLE: ICD-10-CM

## 2020-09-23 PROCEDURE — 97140 MANUAL THERAPY 1/> REGIONS: CPT | Performed by: PHYSICAL THERAPIST

## 2020-09-23 PROCEDURE — 97110 THERAPEUTIC EXERCISES: CPT | Performed by: PHYSICAL THERAPIST

## 2020-09-23 NOTE — PROGRESS NOTES
Daily Note     Today's date: 2020  Patient name: Geoff Yeboah  : 1965  MRN: [id number]  Referring provider: [facility] Lee  Dx:   Encounter Diagnosis     ICD-10-CM    1  Tibial tendonitis, posterior, left  M76 822    2  Chronic pain of left ankle  M25 572     G89 29                   Subjective:  Patient states that she feels better today  Objective: See treatment diary below    Assessment:  Patient presents c/ overall decreased swelling t/o left ankle  TTP t/o post tibial tendon  STM/MFR to decrease myofascial restrictions  Tolerated treatment well  Patient would benefit from continued PT  Plan: Continue per plan of care        Precautions:  HTN; DM; GERD    Manuals             STM/MFR AZ            PROM AZ            Talocrural mobs AZ                         Neuro Re-Ed                                                                                                        Ther Ex             Ankle ABCs 3x            Ankle 4 way 20x ea                         Gastroc str 3x30s                                                                Ther Activity                                       Gait Training                                       Modalities             Ionto X            Compression X

## 2020-09-24 ENCOUNTER — APPOINTMENT (OUTPATIENT)
Dept: PHYSICAL THERAPY | Facility: MEDICAL CENTER | Age: 55
End: 2020-09-24
Payer: COMMERCIAL

## 2020-09-25 ENCOUNTER — OFFICE VISIT (OUTPATIENT)
Dept: PHYSICAL THERAPY | Facility: MEDICAL CENTER | Age: 55
End: 2020-09-25
Payer: COMMERCIAL

## 2020-09-25 DIAGNOSIS — M25.572 CHRONIC PAIN OF LEFT ANKLE: ICD-10-CM

## 2020-09-25 DIAGNOSIS — G89.29 CHRONIC PAIN OF LEFT ANKLE: ICD-10-CM

## 2020-09-25 DIAGNOSIS — M76.822 TIBIAL TENDONITIS, POSTERIOR, LEFT: Primary | ICD-10-CM

## 2020-09-25 PROCEDURE — 97110 THERAPEUTIC EXERCISES: CPT | Performed by: PHYSICAL THERAPIST

## 2020-09-25 PROCEDURE — 97140 MANUAL THERAPY 1/> REGIONS: CPT | Performed by: PHYSICAL THERAPIST

## 2020-09-25 NOTE — PROGRESS NOTES
Daily Note     Today's date: 2020  Patient name: Liberty Nyhan  : 1965  MRN: [id number]  Referring provider: [facility] Cavazos  Dx:   Encounter Diagnosis     ICD-10-CM    1  Tibial tendonitis, posterior, left  M76 822    2  Chronic pain of left ankle  M25 572     G89 29                   Subjective:  Patient states that her ankle is improving  Objective: See treatment diary below    Assessment:  Patient presents c/ less pain and decreased swelling t/o left ankle  TTP t/o post tibial tendon  STM/MFR to decrease myofascial restrictions  Tolerated PRE well  Patient would benefit from continued PT  Plan: Continue per plan of care        Precautions:  HTN; DM; GERD    Manuals            STM/MFR AZ AZ           PROM AZ AZ           Talocrural mobs AZ AZ                        Neuro Re-Ed                                                                                                        Ther Ex             Ankle ABCs 3x 3x           Ankle 4 way 20x ea 20x ea pink                        Gastroc str 3x30s 3x30s                                                               Ther Activity                                       Gait Training                                       Modalities             MH  X           Ionto X X           Compression X X

## 2020-09-29 ENCOUNTER — APPOINTMENT (OUTPATIENT)
Dept: PHYSICAL THERAPY | Facility: MEDICAL CENTER | Age: 55
End: 2020-09-29
Payer: COMMERCIAL

## 2020-10-01 ENCOUNTER — OFFICE VISIT (OUTPATIENT)
Dept: PHYSICAL THERAPY | Facility: MEDICAL CENTER | Age: 55
End: 2020-10-01
Payer: COMMERCIAL

## 2020-10-01 ENCOUNTER — APPOINTMENT (OUTPATIENT)
Dept: PHYSICAL THERAPY | Facility: MEDICAL CENTER | Age: 55
End: 2020-10-01
Payer: COMMERCIAL

## 2020-10-01 DIAGNOSIS — M76.822 TIBIAL TENDONITIS, POSTERIOR, LEFT: Primary | ICD-10-CM

## 2020-10-01 DIAGNOSIS — G89.29 CHRONIC PAIN OF LEFT ANKLE: ICD-10-CM

## 2020-10-01 DIAGNOSIS — M25.572 CHRONIC PAIN OF LEFT ANKLE: ICD-10-CM

## 2020-10-01 PROCEDURE — 97140 MANUAL THERAPY 1/> REGIONS: CPT | Performed by: PHYSICAL THERAPIST

## 2020-10-01 PROCEDURE — 97110 THERAPEUTIC EXERCISES: CPT | Performed by: PHYSICAL THERAPIST

## 2020-10-09 ENCOUNTER — APPOINTMENT (OUTPATIENT)
Dept: PHYSICAL THERAPY | Facility: MEDICAL CENTER | Age: 55
End: 2020-10-09
Payer: COMMERCIAL

## 2022-12-14 ENCOUNTER — PREP FOR PROCEDURE (OUTPATIENT)
Dept: GASTROENTEROLOGY | Facility: CLINIC | Age: 57
End: 2022-12-14

## 2022-12-14 ENCOUNTER — TELEPHONE (OUTPATIENT)
Dept: GASTROENTEROLOGY | Facility: CLINIC | Age: 57
End: 2022-12-14

## 2022-12-14 DIAGNOSIS — Z12.11 SCREENING FOR COLON CANCER: Primary | ICD-10-CM

## 2022-12-14 NOTE — TELEPHONE ENCOUNTER
12/14/22  Screened by: Laurie Morrow    Referring Provider Dr Kim Hemphill: There is no height or weight on file to calculate BMI  Has patient been referred for a routine screening Colonoscopy? yes  Is the patient between 39-70 years old? yes      Previous Colonoscopy yes   If yes:    Date: 2016    Facility:     Reason:       SCHEDULING STAFF: If the patient is between 39yrs-47yrs, please advise patient to confirm benefits/coverage with their insurance company for a routine screening colonoscopy, some insurance carriers will only cover at [phone] or older  If the patient is over 66years old, please schedule an office visit  Does the patient want to see a Gastroenterologist prior to their procedure OR are they having any GI symptoms? no    Has the patient been hospitalized or had abdominal surgery in the past 6 months? no    Does the patient use supplemental oxygen? no    Does the patient take Coumadin, Lovenox, Plavix, Elliquis, Xarelto, or other blood thinning medication? no    Has the patient had a stroke, cardiac event, or stent placed in the past year? no     PT PASSED OA  SCHEDULING STAFF: If patient answers NO to above questions, then schedule procedure  If patient answers YES to above questions, then schedule office appointment  If patient is between 45yrs - 49yrs, please advise patient that we will have to confirm benefits & coverage with their insurance company for a routine screening colonoscopy

## 2022-12-14 NOTE — TELEPHONE ENCOUNTER
Scheduled date of colonoscopy (as of today): 1/23/23     Physician performing colonoscopy: Dr Teodoro Pack    Location of colonoscopy:  [facility]

## 2023-01-20 ENCOUNTER — NURSE TRIAGE (OUTPATIENT)
Dept: OTHER | Facility: OTHER | Age: 58
End: 2023-01-20

## 2023-01-20 RX ORDER — SODIUM CHLORIDE 9 MG/ML
125 INJECTION, SOLUTION INTRAVENOUS CONTINUOUS
Status: CANCELLED | OUTPATIENT
Start: 2023-01-20

## 2023-01-20 NOTE — TELEPHONE ENCOUNTER
Regarding: Needs bowel  prep  ----- Message from Terra Valderrama sent at 1/20/2023 12:18 PM EST -----  "I have a colonoscopy on Monday but the prep was never called in to my pharmacy"

## 2023-01-20 NOTE — TELEPHONE ENCOUNTER
Reason for Disposition  • Colonoscopy, questions about    Answer Assessment - Initial Assessment Questions  1  DATE/TIME: "When did you have your colonoscopy?"       1/23  2  MAIN CONCERN: "What is your main concern right now?" "What questions do you have?"     My prep was not called in to pharmacy      Protocols used: COLONOSCOPY SYMPTOMS AND QUESTIONS-ADULT-AH

## 2023-01-23 ENCOUNTER — HOSPITAL ENCOUNTER (OUTPATIENT)
Dept: GASTROENTEROLOGY | Facility: MEDICAL CENTER | Age: 58
Setting detail: OUTPATIENT SURGERY
Discharge: HOME | End: 2023-01-23
Admitting: INTERNAL MEDICINE

## 2023-01-23 ENCOUNTER — ANESTHESIA EVENT (OUTPATIENT)
Dept: GASTROENTEROLOGY | Facility: MEDICAL CENTER | Age: 58
End: 2023-01-23

## 2023-01-23 ENCOUNTER — ANESTHESIA (OUTPATIENT)
Dept: GASTROENTEROLOGY | Facility: MEDICAL CENTER | Age: 58
End: 2023-01-23

## 2023-01-23 VITALS
BODY MASS INDEX: 31.53 KG/M2 | SYSTOLIC BLOOD PRESSURE: 134 MMHG | RESPIRATION RATE: 16 BRPM | DIASTOLIC BLOOD PRESSURE: 63 MMHG | WEIGHT: 167 LBS | HEIGHT: 61 IN | OXYGEN SATURATION: 99 % | TEMPERATURE: 98.2 F | HEART RATE: 81 BPM

## 2023-01-23 DIAGNOSIS — Z12.11 SCREENING FOR COLON CANCER: ICD-10-CM

## 2023-01-23 PROBLEM — Z98.890 PONV (POSTOPERATIVE NAUSEA AND VOMITING): Status: ACTIVE | Noted: 2023-01-23

## 2023-01-23 PROBLEM — R11.2 PONV (POSTOPERATIVE NAUSEA AND VOMITING): Status: ACTIVE | Noted: 2023-01-23

## 2023-01-23 PROBLEM — G47.30 SLEEP APNEA: Status: ACTIVE | Noted: 2023-01-23

## 2023-01-23 RX ORDER — LIDOCAINE HYDROCHLORIDE 20 MG/ML
INJECTION, SOLUTION EPIDURAL; INFILTRATION; INTRACAUDAL; PERINEURAL AS NEEDED
Status: DISCONTINUED | OUTPATIENT
Start: 2023-01-23 | End: 2023-01-23

## 2023-01-23 RX ORDER — HYDROCHLOROTHIAZIDE 12.5 MG/1
25 CAPSULE, GELATIN COATED ORAL EVERY MORNING
COMMUNITY

## 2023-01-23 RX ORDER — ONDANSETRON 2 MG/ML
INJECTION INTRAMUSCULAR; INTRAVENOUS AS NEEDED
Status: DISCONTINUED | OUTPATIENT
Start: 2023-01-23 | End: 2023-01-23

## 2023-01-23 RX ORDER — PROPOFOL 10 MG/ML
INJECTION, EMULSION INTRAVENOUS AS NEEDED
Status: DISCONTINUED | OUTPATIENT
Start: 2023-01-23 | End: 2023-01-23

## 2023-01-23 RX ORDER — SODIUM CHLORIDE 9 MG/ML
125 INJECTION, SOLUTION INTRAVENOUS CONTINUOUS
Status: DISCONTINUED | OUTPATIENT
Start: 2023-01-23 | End: 2023-01-27 | Stop reason: HOSPADM

## 2023-01-23 RX ADMIN — SODIUM CHLORIDE 125 ML/HR: 0.9 INJECTION, SOLUTION INTRAVENOUS at 10:55

## 2023-01-23 RX ADMIN — ONDANSETRON 4 MG: 2 INJECTION INTRAMUSCULAR; INTRAVENOUS at 11:07

## 2023-01-23 RX ADMIN — PROPOFOL 20 MG: 10 INJECTION, EMULSION INTRAVENOUS at 11:23

## 2023-01-23 RX ADMIN — Medication 40 MG: at 11:13

## 2023-01-23 RX ADMIN — PROPOFOL 50 MG: 10 INJECTION, EMULSION INTRAVENOUS at 11:17

## 2023-01-23 RX ADMIN — PROPOFOL 30 MG: 10 INJECTION, EMULSION INTRAVENOUS at 11:20

## 2023-01-23 RX ADMIN — PROPOFOL 120 MG: 10 INJECTION, EMULSION INTRAVENOUS at 11:09

## 2023-01-23 RX ADMIN — LIDOCAINE HYDROCHLORIDE 4 ML: 20 INJECTION, SOLUTION EPIDURAL; INFILTRATION; INTRACAUDAL; PERINEURAL at 11:09

## 2023-01-23 NOTE — ANESTHESIA PREPROCEDURE EVALUATION
Procedure:  COLONOSCOPY    Relevant Problems   ANESTHESIA   (+) PONV (postoperative nausea and vomiting)      CARDIO   (+) Essential hypertension      NEURO/PSYCH   (+) Chronic pain of left ankle      PULMONARY   (+) Sleep apnea        Physical Exam    Airway    Mallampati score: III         Dental   No notable dental hx     Cardiovascular  Cardiovascular exam normal    Pulmonary  Pulmonary exam normal     Other Findings        Anesthesia Plan  ASA Score- 2     Anesthesia Type- IV sedation with anesthesia with ASA Monitors  Additional Monitors:   Airway Plan:           Plan Factors-Exercise tolerance (METS): >4 METS  Chart reviewed  Patient is not a current smoker  Patient instructed to abstain from smoking on day of procedure  Patient did not smoke on day of surgery  Obstructive sleep apnea risk education given perioperatively  Induction- intravenous  Postoperative Plan-     Informed Consent- Anesthetic plan and risks discussed with patient

## 2023-01-23 NOTE — ANESTHESIA POSTPROCEDURE EVALUATION
Post-Op Assessment Note    CV Status:  Stable    Pain management: adequate     Mental Status:  Alert and awake   Hydration Status:  Euvolemic   PONV Controlled:  Controlled   Airway Patency:  Patent      Post Op Vitals Reviewed: Yes            No notable events documented      BP      Temp      Pulse     Resp      SpO2      /63   Pulse 81   Temp 98 2 °F (36 8 °C) (Temporal)   Resp 16   Ht 5' 1" (1 549 m)   Wt 75 8 kg (167 lb)   SpO2 99%   BMI 31 55 kg/m²

## 2023-01-23 NOTE — H&P
H&P EXAM - Outpatient Endoscopy   Tr Hamm 62 y o  female MRN: [id number]    [address] [facility] PRE/PST   Encounter: [id number]        History and Physical - [facility]  Tr Hamm 62 y o  female MRN: [id number]                  HPI: Tr Hamm is a 62y o  year old female who presents for history of colon polyps      REVIEW OF SYSTEMS: Per the HPI, and otherwise unremarkable  Historical Information   Past Medical History:   Diagnosis Date   • Anxiety    • Back pain    • Diabetes mellitus ([address] )     Pre-diabetes   • Fracture     Left ankle  • GERD (gastroesophageal reflux disease)    • Hypertension    • Impaired ambulation     Left ankle fracture/ Has walking boot, crutches or walker  • PONV (postoperative nausea and vomiting)    • Sleep apnea     Says  said she didn't need CPAP   • Snores    • Wears glasses      Past Surgical History:   Procedure Laterality Date   • APPENDECTOMY     • COLONOSCOPY     • HYSTERECTOMY      Partial  Has ovaries   • WA OPEN TX DISTAL TIBIOFIBULAR JOINT DISRUPTION Left 8/16/2019    Procedure: LEFT ANKLE SYNDESMOTIC LIGAMENT & ATFL REPAIR;  Surgeon: Kerline Tomlin DPM;  Location: [facility];  Service: Podiatry     Social History   Social History     Substance and Sexual Activity   Alcohol Use Yes    Comment: social     Social History     Substance and Sexual Activity   Drug Use Never     Social History     Tobacco Use   Smoking Status Former   • Packs/day: 0 25   • Years: 10 00   • Pack years: 2 50   • Types: Cigarettes   Smokeless Tobacco Never   Tobacco Comments    Quit 25 years ago     No family history on file      Meds/Allergies     (Not in a hospital admission)      Allergies   Allergen Reactions   • Gabapentin Facial Swelling and Swelling     Pt states that face swelled after taking medicine         Objective     /79   Pulse 79   Temp 98 2 °F (36 8 °C) (Temporal)   Resp 16   Ht 5' 1" (1 549 m)   Wt 75 8 kg (167 lb)   SpO2 100%   BMI 31 55 kg/m²       PHYSICAL EXAM    Gen: NAD  CV: RRR  CHEST: Clear  ABD: soft, NT/ND  EXT: no edema      ASSESSMENT/PLAN:  This is a 62y o  year old female here for colonoscopy, and she is stable and optimized for her procedure

## (undated) DEVICE — TAPE CAST 4IN FIBERGLASS 4YD WHITE

## (undated) DEVICE — CUFF TOURNIQUET 30 X 4 IN QUICK CONNECT DISP 1BLA

## (undated) DEVICE — SYRINGE 10ML LL

## (undated) DEVICE — SCD SEQUENTIAL COMPRESSION COMFORT SLEEVE MEDIUM KNEE LENGTH: Brand: KENDALL SCD

## (undated) DEVICE — CAST PADDING 4 IN SYNTHETIC NON-STRL

## (undated) DEVICE — PENCIL ELECTROSURG E-Z CLEAN -0035H

## (undated) DEVICE — NEEDLE 25G X 1 1/2

## (undated) DEVICE — ARTHREX DRILL BIT 2.5MM

## (undated) DEVICE — TUBING SUCTION 5MM X 12 FT

## (undated) DEVICE — GLOVE INDICATOR PI UNDERGLOVE SZ 7 BLUE

## (undated) DEVICE — 10FR FRAZIER SUCTION HANDLE: Brand: CARDINAL HEALTH

## (undated) DEVICE — ANCHOR SUT TAK-BB THRD
Type: IMPLANTABLE DEVICE | Site: ANKLE | Status: NON-FUNCTIONAL
Removed: 2019-08-16

## (undated) DEVICE — WEBRIL 6 IN UNSTERILE

## (undated) DEVICE — GLOVE SRG BIOGEL 6

## (undated) DEVICE — GLOVE INDICATOR PI UNDERGLOVE SZ 7.5 BLUE

## (undated) DEVICE — DRAPE C-ARMOUR

## (undated) DEVICE — SUT VICRYL 3-0 SH 27 IN J416H

## (undated) DEVICE — ACE WRAP 6 IN UNSTERILE

## (undated) DEVICE — BETHLEHEM UNIVERSAL  MIONR EXT: Brand: CARDINAL HEALTH

## (undated) DEVICE — 3000CC GUARDIAN II: Brand: GUARDIAN

## (undated) DEVICE — CURITY NON-ADHERENT STRIPS: Brand: CURITY

## (undated) DEVICE — ACE WRAP 4 IN UNSTERILE

## (undated) DEVICE — INTENDED FOR TISSUE SEPARATION, AND OTHER PROCEDURES THAT REQUIRE A SHARP SURGICAL BLADE TO PUNCTURE OR CUT.: Brand: BARD-PARKER ® CARBON RIB-BACK BLADES

## (undated) DEVICE — DRAPE C-ARM X-RAY

## (undated) DEVICE — GLOVE INDICATOR PI UNDERGLOVE SZ 6.5 BLUE

## (undated) DEVICE — GLOVE SRG BIOGEL 7.5

## (undated) DEVICE — PADDING CAST 4 IN  COTTON STRL

## (undated) DEVICE — GLOVE PI ULTRA TOUCH SZ.7.0

## (undated) DEVICE — GLOVE SRG BIOGEL 6.5

## (undated) DEVICE — SUT ETHILON 4-0 PS-2 18 IN 1667H

## (undated) DEVICE — STANDARD SURGICAL GOWN, L: Brand: CONVERTORS

## (undated) DEVICE — NEEDLE 18 G X 1 1/2

## (undated) DEVICE — SUT VICRYL 2-0 SH 27 IN UNDYED J417H